# Patient Record
Sex: FEMALE | Race: BLACK OR AFRICAN AMERICAN | Employment: OTHER | URBAN - METROPOLITAN AREA
[De-identification: names, ages, dates, MRNs, and addresses within clinical notes are randomized per-mention and may not be internally consistent; named-entity substitution may affect disease eponyms.]

---

## 2017-06-02 RX ORDER — METOPROLOL SUCCINATE 100 MG/1
TABLET, EXTENDED RELEASE ORAL
Qty: 135 TAB | Refills: 1 | Status: SHIPPED | OUTPATIENT
Start: 2017-06-02 | End: 2020-03-28 | Stop reason: SDUPTHER

## 2018-10-25 ENCOUNTER — OFFICE VISIT (OUTPATIENT)
Dept: NEUROLOGY | Age: 58
End: 2018-10-25

## 2018-10-25 VITALS — RESPIRATION RATE: 20 BRPM | HEIGHT: 63 IN | BODY MASS INDEX: 40.75 KG/M2 | WEIGHT: 230 LBS

## 2018-10-25 DIAGNOSIS — G89.29 CHRONIC LOW BACK PAIN, UNSPECIFIED BACK PAIN LATERALITY, WITH SCIATICA PRESENCE UNSPECIFIED: Primary | ICD-10-CM

## 2018-10-25 DIAGNOSIS — M54.5 CHRONIC LOW BACK PAIN, UNSPECIFIED BACK PAIN LATERALITY, WITH SCIATICA PRESENCE UNSPECIFIED: Primary | ICD-10-CM

## 2018-10-25 NOTE — PATIENT INSTRUCTIONS
After sitting for a while once she gets up she has a lot of tail bone pain   Epidural injections? Has had about 6 epidural injections in the last 2 years with Dr. Elma Ramírez from her shoulders to her hips   After she sits she does have some numbness in the back of her legs   She has no bowel/bladder control anymore   Had a fall of Sept 2016 and since then she has had bulging discs issues              A Healthy Lifestyle: Care Instructions  Your Care Instructions    A healthy lifestyle can help you feel good, stay at a healthy weight, and have plenty of energy for both work and play. A healthy lifestyle is something you can share with your whole family. A healthy lifestyle also can lower your risk for serious health problems, such as high blood pressure, heart disease, and diabetes. You can follow a few steps listed below to improve your health and the health of your family. Follow-up care is a key part of your treatment and safety. Be sure to make and go to all appointments, and call your doctor if you are having problems. It's also a good idea to know your test results and keep a list of the medicines you take. How can you care for yourself at home? · Do not eat too much sugar, fat, or fast foods. You can still have dessert and treats now and then. The goal is moderation. · Start small to improve your eating habits. Pay attention to portion sizes, drink less juice and soda pop, and eat more fruits and vegetables. ? Eat a healthy amount of food. A 3-ounce serving of meat, for example, is about the size of a deck of cards. Fill the rest of your plate with vegetables and whole grains. ? Limit the amount of soda and sports drinks you have every day. Drink more water when you are thirsty. ? Eat at least 5 servings of fruits and vegetables every day. It may seem like a lot, but it is not hard to reach this goal. A serving or helping is 1 piece of fruit, 1 cup of vegetables, or 2 cups of leafy, raw vegetables.  Have an apple or some carrot sticks as an afternoon snack instead of a candy bar. Try to have fruits and/or vegetables at every meal.  · Make exercise part of your daily routine. You may want to start with simple activities, such as walking, bicycling, or slow swimming. Try to be active 30 to 60 minutes every day. You do not need to do all 30 to 60 minutes all at once. For example, you can exercise 3 times a day for 10 or 20 minutes. Moderate exercise is safe for most people, but it is always a good idea to talk to your doctor before starting an exercise program.  · Keep moving. Doyne Acron the lawn, work in the garden, or [employer]. Take the stairs instead of the elevator at work. · If you smoke, quit. People who smoke have an increased risk for heart attack, stroke, cancer, and other lung illnesses. Quitting is hard, but there are ways to boost your chance of quitting tobacco for good. ? Use nicotine gum, patches, or lozenges. ? Ask your doctor about stop-smoking programs and medicines. ? Keep trying. In addition to reducing your risk of diseases in the future, you will notice some benefits soon after you stop using tobacco. If you have shortness of breath or asthma symptoms, they will likely get better within a few weeks after you quit. · Limit how much alcohol you drink. Moderate amounts of alcohol (up to 2 drinks a day for men, 1 drink a day for women) are okay. But drinking too much can lead to liver problems, high blood pressure, and other health problems. Family health  If you have a family, there are many things you can do together to improve your health. · Eat meals together as a family as often as possible. · Eat healthy foods. This includes fruits, vegetables, lean meats and dairy, and whole grains. · Include your family in your fitness plan. Most people think of activities such as jogging or tennis as the way to fitness, but there are many ways you and your family can be more active.  Anything that makes you breathe hard and gets your heart pumping is exercise. Here are some tips:  ? Walk to do errands or to take your child to school or the bus.  ? Go for a family bike ride after dinner instead of watching TV. Where can you learn more? Go to http://arlene-sofi.info/. Enter B521 in the search box to learn more about \"A Healthy Lifestyle: Care Instructions. \"  Current as of: December 7, 2017  Content Version: 11.8  © 8441-1957 [employer], Incorporated. Care instructions adapted under license by [employer] (which disclaims liability or warranty for this information). If you have questions about a medical condition or this instruction, always ask your healthcare professional. [address] any warranty or liability for your use of this information.

## 2018-10-25 NOTE — PROGRESS NOTES
Patient was referred to discuss lumbar epidural steroid injection regarding severe lower back pain, lumbar radicular pain she's experienced after a fall (Sept 2016). She's has multiple injections by Dr Natty [facility]. I have no records (progress notes or imaging reports) to review today. Patient reports she will return to Dr Ruby Martinez (ortho) and Dr Peyton Juárez to discuss further care.

## 2019-04-25 ENCOUNTER — APPOINTMENT (OUTPATIENT)
Dept: GENERAL RADIOLOGY | Age: 59
End: 2019-04-25
Attending: STUDENT IN AN ORGANIZED HEALTH CARE EDUCATION/TRAINING PROGRAM
Payer: COMMERCIAL

## 2019-04-25 ENCOUNTER — HOSPITAL ENCOUNTER (EMERGENCY)
Age: 59
Discharge: HOME | End: 2019-04-25
Attending: STUDENT IN AN ORGANIZED HEALTH CARE EDUCATION/TRAINING PROGRAM | Admitting: STUDENT IN AN ORGANIZED HEALTH CARE EDUCATION/TRAINING PROGRAM
Payer: COMMERCIAL

## 2019-04-25 VITALS
DIASTOLIC BLOOD PRESSURE: 99 MMHG | BODY MASS INDEX: 40.74 KG/M2 | WEIGHT: 230 LBS | OXYGEN SATURATION: 98 % | HEART RATE: 93 BPM | SYSTOLIC BLOOD PRESSURE: 165 MMHG | TEMPERATURE: 98.8 F | RESPIRATION RATE: 16 BRPM

## 2019-04-25 DIAGNOSIS — M54.30 SCIATICA, UNSPECIFIED LATERALITY: Primary | ICD-10-CM

## 2019-04-25 PROCEDURE — 99283 EMERGENCY DEPT VISIT LOW MDM: CPT

## 2019-04-25 PROCEDURE — 74011250636 HC RX REV CODE- 250/636: Performed by: STUDENT IN AN ORGANIZED HEALTH CARE EDUCATION/TRAINING PROGRAM

## 2019-04-25 PROCEDURE — 96372 THER/PROPH/DIAG INJ SC/IM: CPT

## 2019-04-25 PROCEDURE — 74011250637 HC RX REV CODE- 250/637: Performed by: STUDENT IN AN ORGANIZED HEALTH CARE EDUCATION/TRAINING PROGRAM

## 2019-04-25 PROCEDURE — 72100 X-RAY EXAM L-S SPINE 2/3 VWS: CPT

## 2019-04-25 RX ORDER — ACETAMINOPHEN 325 MG/1
650 TABLET ORAL
Status: COMPLETED | OUTPATIENT
Start: 2019-04-25 | End: 2019-04-25

## 2019-04-25 RX ORDER — PREDNISONE 50 MG/1
50 TABLET ORAL DAILY
Qty: 3 TAB | Refills: 0 | Status: SHIPPED | OUTPATIENT
Start: 2019-04-25 | End: 2019-04-28

## 2019-04-25 RX ORDER — CYCLOBENZAPRINE HCL 5 MG
5 TABLET ORAL
Qty: 15 TAB | Refills: 0 | Status: SHIPPED | OUTPATIENT
Start: 2019-04-25 | End: 2019-04-30

## 2019-04-25 RX ORDER — KETOROLAC TROMETHAMINE 30 MG/ML
30 INJECTION, SOLUTION INTRAMUSCULAR; INTRAVENOUS
Status: COMPLETED | OUTPATIENT
Start: 2019-04-25 | End: 2019-04-25

## 2019-04-25 RX ADMIN — ACETAMINOPHEN 650 MG: 325 TABLET ORAL at 17:41

## 2019-04-25 RX ADMIN — KETOROLAC TROMETHAMINE 30 MG: 30 INJECTION, SOLUTION INTRAMUSCULAR at 16:26

## 2019-04-25 NOTE — ED PROVIDER NOTES
62 y.o. female with past medical history significant for asthma, HTN, anxiety, DJD, and fibromyalgia who presents from home via private vehicle with chief complaint of back pain. 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 While doing this, she felt lightheaded, was seen at on outside facility and given IVF. She has been resting since getting home, then one hour ago tried to get up but started with right sided lower back pain radiating down her right leg, similar to previous episodes of sciatica. Patient took flexeril PTA with some relief. When asked about bowel or bladder incontinence, patient states she has not had control of her bladder in 30 years and no control of her bowels for 2 years. Patient states she is on gabapentin, has been off Cymbalta for a few months. Last MRI was \"a little over a year ago\". She has a h/o multiple steroid injections, has already contacted her orthopedist about this episode of pain. Patient denies any chest pain, SOB, abdominal pain, or fevers. There are no other acute medical concerns at this time. Social hx: Nonsmoker  PCP: Elise Lantigua MD  Ortho: Giselle Hussein MD    Note written by Gavi Calle, as dictated by Ariel Martínez MD 4:08 PM    The history is provided by the patient. No  was used.         Past Medical History:   Diagnosis Date    Anxiety      [facility] (allergic rhinitis)      [facility]    Asthma      Cellulitis of face 6/2016    [facility]- right side of face    CTS (carpal tunnel syndrome)      DJD (degenerative joint disease) of lumbar spine     Fatty liver     Fibromyalgia     HBP (high blood pressure)      Hiatal hernia     Lumbar disc disease      Lumbar spondylosis 12/10/13    Peripheral edema      Radiculitis 12/10/13    lumbar    Trigger thumb of left hand 6/17/2013    Injection left thumb mid March 2013, Dr Daivd Curiel incontinence        riya morel       Past Surgical History:   Procedure Laterality Date    COLONOSCOPY      nov 2010; normal    HX GI      cholestcystec    HX GYN      bladder neck    HX HERNIA REPAIR      hiatal hernia    HX ORTHOPAEDIC      carpal and tarsal tunnel released    HX ORTHOPAEDIC  4/29/2013    2nd and 4th finger, right hand, trigger release         Family History:   Problem Relation Age of Onset    Alcohol abuse Father     Cancer Father         prostate    Hypertension Father     Diabetes Father     Diabetes Mother     Hypertension Mother     Arthritis-osteo Mother     Alcohol abuse Brother     Cancer Maternal Uncle         colon    Stroke Paternal Uncle     Cancer Maternal Grandfather         lung ca       Social History     Socioeconomic History    Marital status: SINGLE     Spouse name: Not on file    Number of children: Not on file    Years of education: Not on file    Highest education level: Not on file   Occupational History    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[place] [employer]     Employer: department of [employer] resource strain: Not on file    Food insecurity:     Worry: Not on file     Inability: Not on file    Transportation needs:     Medical: Not on file     Non-medical: Not on file   Tobacco Use    Smoking status: Never Smoker    Smokeless tobacco: Never Used   Substance and Sexual Activity    Alcohol use: No    Drug use: No    Sexual activity: Not Currently   Lifestyle    Physical activity:     Days per week: Not on file     Minutes per session: Not on file    Stress: Not on file   Relationships    Social connections:     Talks on phone: Not on file     Gets together: Not on file     Attends [employer] service: Not on file     Active member of club or organization: Not on file     Attends meetings of clubs or organizations: Not on file     Relationship status: Not on file    Intimate partner violence:     Fear of current or ex partner: Not on file     Emotionally abused: Not on file     Physically abused: Not on file     Forced sexual activity: Not on file   Other Topics Concern    Not on file   Social History Narrative    Not on file         ALLERGIES: Ace inhibitors; Atorvastatin; Marcaine [bupivacaine]; and Sulfa (sulfonamide antibiotics)    Review of Systems   Constitutional: Negative for activity change, diaphoresis, fatigue and fever. HENT: Negative for congestion and sore throat. Eyes: Negative for photophobia and visual disturbance. Respiratory: Negative for chest tightness and shortness of breath. Cardiovascular: Negative for chest pain, palpitations and leg swelling. Gastrointestinal: Negative for abdominal pain, blood in stool, constipation, diarrhea, nausea and vomiting. Genitourinary: Negative for difficulty urinating, dysuria, flank pain, frequency and hematuria. Musculoskeletal: Positive for back pain and myalgias. Neurological: Negative for dizziness, syncope, numbness and headaches. All other systems reviewed and are negative. Vitals:    04/25/19 1611   BP: (!) 165/99   Pulse: 93   Resp: 16   Temp: 98.8 °F (37.1 °C)   SpO2: 98%   Weight: 104.3 kg (230 lb)            Physical Exam   Constitutional: She is oriented to person, place, and time. She appears well-developed and well-nourished. No distress. HENT:   Head: Normocephalic and atraumatic. Nose: Nose normal.   Mouth/Throat: Oropharynx is clear and moist. No oropharyngeal exudate. Eyes: Conjunctivae and EOM are normal. Right eye exhibits no discharge. Left eye exhibits no discharge. No scleral icterus. Neck: Normal range of motion. Neck supple. Cardiovascular: Normal rate, regular rhythm and intact distal pulses. Pulmonary/Chest: Effort normal. No respiratory distress. She exhibits no tenderness. Abdominal: Bowel sounds are normal. She exhibits no distension and no mass. There is no tenderness. There is no rebound.    Musculoskeletal: Normal range of motion. She exhibits no edema. Lumbar back: She exhibits tenderness. Midline L-spine tenderness. Neurological: She is alert and oriented to person, place, and time. No cranial nerve deficit. Coordination normal.   Skin: Skin is warm and dry. No rash noted. She is not diaphoretic. No erythema. No pallor. Psychiatric: She has a normal mood and affect. Her behavior is normal. Judgment and thought content normal.   Nursing note and vitals reviewed. Note written by Gavi Carter, as dictated by Sita Disla MD 4:08 PM    MDM  Number of Diagnoses or Management Options  Sciatica, unspecified laterality:      Amount and/or Complexity of Data Reviewed  Tests in the radiology section of CPT®: reviewed and ordered  Review and summarize past medical records: yes  Independent visualization of images, tracings, or specimens: yes    Risk of Complications, Morbidity, and/or Mortality  Presenting problems: moderate  Diagnostic procedures: moderate  Management options: moderate    Patient Progress  Patient progress: improved         Procedures    5:46 PM  X-Ray negative. Discharging home. Patient agreeable.

## 2019-04-25 NOTE — ED TRIAGE NOTES
Here with sciatica, right leg pain and spasm. Hx of same. Felt bad last night at work, seen in ER last night at work and given fluids. Took flexeril at home. No change in bladder,bowel control. Baseline is not controlled. No change.

## 2019-04-25 NOTE — DISCHARGE INSTRUCTIONS
Patient Education        Back Pain: Care Instructions  Your Care Instructions    Back pain has many possible causes. It is often related to problems with muscles and ligaments of the back. It may also be related to problems with the nerves, discs, or bones of the back. Moving, lifting, standing, sitting, or sleeping in an awkward way can strain the back. Sometimes you don't notice the injury until later. Arthritis is another common cause of back pain. Although it may hurt a lot, back pain usually improves on its own within several weeks. Most people recover in 12 weeks or less. Using good home treatment and being careful not to stress your back can help you feel better sooner. Follow-up care is a key part of your treatment and safety. Be sure to make and go to all appointments, and call your doctor if you are having problems. It's also a good idea to know your test results and keep a list of the medicines you take. How can you care for yourself at home? · Sit or lie in positions that are most comfortable and reduce your pain. Try one of these positions when you lie down:  ? Lie on your back with your knees bent and supported by large pillows. ? Lie on the floor with your legs on the seat of a sofa or chair. ? Lie on your side with your knees and hips bent and a pillow between your legs. ? Lie on your stomach if it does not make pain worse. · Do not sit up in bed, and avoid soft couches and twisted positions. Bed rest can help relieve pain at first, but it delays healing. Avoid bed rest after the first day of back pain. · Change positions every 30 minutes. If you must sit for long periods of time, take breaks from sitting. Get up and walk around, or lie in a comfortable position. · Try using a heating pad on a low or medium setting for 15 to 20 minutes every 2 or 3 hours. Try a warm shower in place of one session with the heating pad. · You can also try an ice pack for 10 to 15 minutes every 2 to 3 hours. Put a thin cloth between the ice pack and your skin. · Take pain medicines exactly as directed. ? If the doctor gave you a prescription medicine for pain, take it as prescribed. ? If you are not taking a prescription pain medicine, ask your doctor if you can take an over-the-counter medicine. · Take short walks several times a day. You can start with 5 to 10 minutes, 3 or 4 times a day, and work up to longer walks. Walk on level surfaces and avoid hills and stairs until your back is better. · Return to work and other activities as soon as you can. Continued rest without activity is usually not good for your back. · To prevent future back pain, do exercises to stretch and strengthen your back and stomach. Learn how to use good posture, safe lifting techniques, and proper body mechanics. When should you call for help? Call your doctor now or seek immediate medical care if:    · You have new or worsening numbness in your legs.     · You have new or worsening weakness in your legs. (This could make it hard to stand up.)     · You lose control of your bladder or bowels.    Watch closely for changes in your health, and be sure to contact your doctor if:    · You have a fever, lose weight, or don't feel well.     · You do not get better as expected. Where can you learn more? Go to http://arlene-sofi.info/. Enter O924 in the search box to learn more about \"Back Pain: Care Instructions. \"  Current as of: September 20, 2018  Content Version: 11.9  © 1599-2243 [employer]. Care instructions adapted under license by [employer] (which disclaims liability or warranty for this information). If you have questions about a medical condition or this instruction, always ask your healthcare professional. [place] [address] any warranty or liability for your use of this information.

## 2019-09-23 ENCOUNTER — APPOINTMENT (OUTPATIENT)
Dept: CT IMAGING | Age: 59
End: 2019-09-23
Attending: EMERGENCY MEDICINE
Payer: COMMERCIAL

## 2019-09-23 ENCOUNTER — HOSPITAL ENCOUNTER (EMERGENCY)
Age: 59
Discharge: HOME | End: 2019-09-24
Attending: EMERGENCY MEDICINE
Payer: COMMERCIAL

## 2019-09-23 DIAGNOSIS — N30.00 ACUTE CYSTITIS WITHOUT HEMATURIA: Primary | ICD-10-CM

## 2019-09-23 DIAGNOSIS — R53.83 MALAISE AND FATIGUE: ICD-10-CM

## 2019-09-23 DIAGNOSIS — R53.81 MALAISE AND FATIGUE: ICD-10-CM

## 2019-09-23 LAB
ALBUMIN SERPL-MCNC: 4 G/DL (ref 3.5–5)
ALBUMIN/GLOB SERPL: 1 {RATIO} (ref 1.1–2.2)
ALP SERPL-CCNC: 85 U/L (ref 45–117)
ALT SERPL-CCNC: 32 U/L (ref 12–78)
AMMONIA PLAS-SCNC: 23 UMOL/L
ANION GAP SERPL CALC-SCNC: 8 MMOL/L (ref 5–15)
AST SERPL-CCNC: 25 U/L (ref 15–37)
BASOPHILS # BLD: 0 K/UL (ref 0–0.1)
BASOPHILS NFR BLD: 1 % (ref 0–1)
BILIRUB SERPL-MCNC: 0.5 MG/DL (ref 0.2–1)
BUN SERPL-MCNC: 10 MG/DL (ref 6–20)
BUN/CREAT SERPL: 12 (ref 12–20)
CALCIUM SERPL-MCNC: 9.4 MG/DL (ref 8.5–10.1)
CHLORIDE SERPL-SCNC: 105 MMOL/L (ref 97–108)
CO2 SERPL-SCNC: 27 MMOL/L (ref 21–32)
COMMENT, HOLDF: NORMAL
CREAT SERPL-MCNC: 0.83 MG/DL (ref 0.55–1.02)
DIFFERENTIAL METHOD BLD: NORMAL
EOSINOPHIL # BLD: 0.2 K/UL (ref 0–0.4)
EOSINOPHIL NFR BLD: 3 % (ref 0–7)
ERYTHROCYTE [DISTWIDTH] IN BLOOD BY AUTOMATED COUNT: 13.3 % (ref 11.5–14.5)
GLOBULIN SER CALC-MCNC: 3.9 G/DL (ref 2–4)
GLUCOSE SERPL-MCNC: 182 MG/DL (ref 65–100)
HCT VFR BLD AUTO: 38 % (ref 35–47)
HGB BLD-MCNC: 12.3 G/DL (ref 11.5–16)
IMM GRANULOCYTES # BLD AUTO: 0 K/UL (ref 0–0.04)
IMM GRANULOCYTES NFR BLD AUTO: 0 % (ref 0–0.5)
LACTATE BLD-SCNC: 1.71 MMOL/L (ref 0.4–2)
LYMPHOCYTES # BLD: 2.1 K/UL (ref 0.8–3.5)
LYMPHOCYTES NFR BLD: 30 % (ref 12–49)
MCH RBC QN AUTO: 28.7 PG (ref 26–34)
MCHC RBC AUTO-ENTMCNC: 32.4 G/DL (ref 30–36.5)
MCV RBC AUTO: 88.8 FL (ref 80–99)
MONOCYTES # BLD: 0.6 K/UL (ref 0–1)
MONOCYTES NFR BLD: 9 % (ref 5–13)
NEUTS SEG # BLD: 3.9 K/UL (ref 1.8–8)
NEUTS SEG NFR BLD: 57 % (ref 32–75)
NRBC # BLD: 0 K/UL (ref 0–0.01)
NRBC BLD-RTO: 0 PER 100 WBC
PLATELET # BLD AUTO: 226 K/UL (ref 150–400)
PMV BLD AUTO: 11.2 FL (ref 8.9–12.9)
POTASSIUM SERPL-SCNC: 3.8 MMOL/L (ref 3.5–5.1)
PROT SERPL-MCNC: 7.9 G/DL (ref 6.4–8.2)
RBC # BLD AUTO: 4.28 M/UL (ref 3.8–5.2)
SAMPLES BEING HELD,HOLD: NORMAL
SODIUM SERPL-SCNC: 140 MMOL/L (ref 136–145)
TROPONIN I SERPL-MCNC: <0.05 NG/ML
TSH SERPL DL<=0.05 MIU/L-ACNC: 1.89 UIU/ML (ref 0.36–3.74)
WBC # BLD AUTO: 6.9 K/UL (ref 3.6–11)

## 2019-09-23 PROCEDURE — 84443 ASSAY THYROID STIM HORMONE: CPT

## 2019-09-23 PROCEDURE — 70450 CT HEAD/BRAIN W/O DYE: CPT

## 2019-09-23 PROCEDURE — 87040 BLOOD CULTURE FOR BACTERIA: CPT

## 2019-09-23 PROCEDURE — 84484 ASSAY OF TROPONIN QUANT: CPT

## 2019-09-23 PROCEDURE — 81001 URINALYSIS AUTO W/SCOPE: CPT

## 2019-09-23 PROCEDURE — 82140 ASSAY OF AMMONIA: CPT

## 2019-09-23 PROCEDURE — 36415 COLL VENOUS BLD VENIPUNCTURE: CPT

## 2019-09-23 PROCEDURE — 74011250636 HC RX REV CODE- 250/636: Performed by: EMERGENCY MEDICINE

## 2019-09-23 PROCEDURE — 99285 EMERGENCY DEPT VISIT HI MDM: CPT

## 2019-09-23 PROCEDURE — 87086 URINE CULTURE/COLONY COUNT: CPT

## 2019-09-23 PROCEDURE — 80053 COMPREHEN METABOLIC PANEL: CPT

## 2019-09-23 PROCEDURE — 96374 THER/PROPH/DIAG INJ IV PUSH: CPT

## 2019-09-23 PROCEDURE — 83605 ASSAY OF LACTIC ACID: CPT

## 2019-09-23 PROCEDURE — 85025 COMPLETE CBC W/AUTO DIFF WBC: CPT

## 2019-09-23 RX ORDER — ONDANSETRON 2 MG/ML
4 INJECTION INTRAMUSCULAR; INTRAVENOUS
Status: COMPLETED | OUTPATIENT
Start: 2019-09-23 | End: 2019-09-23

## 2019-09-23 RX ADMIN — SODIUM CHLORIDE 1000 ML: 900 INJECTION, SOLUTION INTRAVENOUS at 23:17

## 2019-09-23 RX ADMIN — ONDANSETRON 4 MG: 2 INJECTION INTRAMUSCULAR; INTRAVENOUS at 23:18

## 2019-09-24 VITALS
RESPIRATION RATE: 16 BRPM | HEIGHT: 63 IN | OXYGEN SATURATION: 94 % | DIASTOLIC BLOOD PRESSURE: 83 MMHG | HEART RATE: 97 BPM | SYSTOLIC BLOOD PRESSURE: 148 MMHG | TEMPERATURE: 98.4 F | BODY MASS INDEX: 38.98 KG/M2 | WEIGHT: 220 LBS

## 2019-09-24 LAB
APPEARANCE UR: CLEAR
BACTERIA URNS QL MICRO: ABNORMAL /HPF
BILIRUB UR QL: NEGATIVE
COLOR UR: ABNORMAL
EPITH CASTS URNS QL MICRO: ABNORMAL /LPF
GLUCOSE UR STRIP.AUTO-MCNC: NEGATIVE MG/DL
HGB UR QL STRIP: NEGATIVE
HYALINE CASTS URNS QL MICRO: ABNORMAL /LPF (ref 0–5)
KETONES UR QL STRIP.AUTO: NEGATIVE MG/DL
LEUKOCYTE ESTERASE UR QL STRIP.AUTO: ABNORMAL
NITRITE UR QL STRIP.AUTO: NEGATIVE
PH UR STRIP: 6 [PH] (ref 5–8)
PROT UR STRIP-MCNC: ABNORMAL MG/DL
RBC #/AREA URNS HPF: ABNORMAL /HPF (ref 0–5)
SP GR UR REFRACTOMETRY: 1.02 (ref 1–1.03)
UA: UC IF INDICATED,UAUC: ABNORMAL
UROBILINOGEN UR QL STRIP.AUTO: 0.2 EU/DL (ref 0.2–1)
WBC URNS QL MICRO: ABNORMAL /HPF (ref 0–4)

## 2019-09-24 RX ORDER — ONDANSETRON 8 MG/1
8 TABLET, ORALLY DISINTEGRATING ORAL
Qty: 15 TAB | Refills: 0 | Status: SHIPPED | OUTPATIENT
Start: 2019-09-24 | End: 2021-08-16

## 2019-09-24 RX ORDER — FLUCONAZOLE 150 MG/1
150 TABLET ORAL DAILY
Qty: 1 TAB | Refills: 0 | Status: SHIPPED | OUTPATIENT
Start: 2019-09-24 | End: 2020-02-20 | Stop reason: ALTCHOICE

## 2019-09-24 RX ORDER — CEPHALEXIN 500 MG/1
500 CAPSULE ORAL 2 TIMES DAILY
Qty: 8 CAP | Refills: 0 | Status: SHIPPED | OUTPATIENT
Start: 2019-09-24 | End: 2019-09-28

## 2019-09-24 NOTE — ED TRIAGE NOTES
Pt reports she was at work on Friday night and \"I thought I passed out but my coworker thought I had a seizure because I lost bladder control. They did labs and an EKG and told me my lactic was elevated, so they gave me fluids. \" Pt denies any seizure hx. Pt referred here for employee health eval. Pt reports feeling \"washed out and exhausted\" since then. Pt reports chills, muscle and \"bone aches\".

## 2019-09-24 NOTE — ED NOTES
Bedside shift change report given to Olivia Luna (oncoming nurse) by Andrea Jaquez RN (offgoing nurse). Report included the following information SBAR, ED Summary, Procedure Summary, MAR and Recent Results.

## 2019-09-24 NOTE — DISCHARGE INSTRUCTIONS
Patient Education        Urinary Tract Infection in Women: Care Instructions  Your Care Instructions    A urinary tract infection, or UTI, is a general term for an infection anywhere between the kidneys and the urethra (where urine comes out). Most UTIs are bladder infections. They often cause pain or burning when you urinate. UTIs are caused by bacteria and can be cured with antibiotics. Be sure to complete your treatment so that the infection goes away. Follow-up care is a key part of your treatment and safety. Be sure to make and go to all appointments, and call your doctor if you are having problems. It's also a good idea to know your test results and keep a list of the medicines you take. How can you care for yourself at home? · Take your antibiotics as directed. Do not stop taking them just because you feel better. You need to take the full course of antibiotics. · Drink extra water and other fluids for the next day or two. This may help wash out the bacteria that are causing the infection. (If you have kidney, heart, or liver disease and have to limit fluids, talk with your doctor before you increase your fluid intake.)  · Avoid drinks that are carbonated or have caffeine. They can irritate the bladder. · Urinate often. Try to empty your bladder each time. · To relieve pain, take a hot bath or lay a heating pad set on low over your lower belly or genital area. Never go to sleep with a heating pad in place. To prevent UTIs  · Drink plenty of water each day. This helps you urinate often, which clears bacteria from your system. (If you have kidney, heart, or liver disease and have to limit fluids, talk with your doctor before you increase your fluid intake.)  · Urinate when you need to. · Urinate right after you have sex. · Change sanitary pads often. · Avoid douches, bubble baths, feminine hygiene sprays, and other feminine hygiene products that have deodorants.   · After going to the bathroom, wipe from front to back. When should you call for help? Call your doctor now or seek immediate medical care if:    · Symptoms such as fever, chills, nausea, or vomiting get worse or appear for the first time.     · You have new pain in your back just below your rib cage. This is called flank pain.     · There is new blood or pus in your urine.     · You have any problems with your antibiotic medicine.    Watch closely for changes in your health, and be sure to contact your doctor if:    · You are not getting better after taking an antibiotic for 2 days.     · Your symptoms go away but then come back. Where can you learn more? Go to http://arlene-sofi.info/. Enter O625 in the search box to learn more about \"Urinary Tract Infection in Women: Care Instructions. \"  Current as of: December 19, 2018  Content Version: 12.2  © 2761-3056 [employer]. Care instructions adapted under license by [employer] (which disclaims liability or warranty for this information). If you have questions about a medical condition or this instruction, always ask your healthcare professional. [address] any warranty or liability for your use of this information. Patient Education        Fatigue: Care Instructions  Your Care Instructions    Fatigue is a feeling of tiredness, exhaustion, or lack of energy. You may feel fatigue because of too much or not enough activity. It can also come from stress, lack of sleep, boredom, and poor diet. Many medical problems, such as viral infections, can cause fatigue. Emotional problems, especially depression, are often the cause of fatigue. Fatigue is most often a symptom of another problem. Treatment for fatigue depends on the cause. For example, if you have fatigue because you have a certain health problem, treating this problem also treats your fatigue. If depression or anxiety is the cause, treatment may help.   Follow-up care is a key part of your treatment and safety. Be sure to make and go to all appointments, and call your doctor if you are having problems. It's also a good idea to know your test results and keep a list of the medicines you take. How can you care for yourself at home? · Get regular exercise. But don't overdo it. Go back and forth between rest and exercise. · Get plenty of rest.  · Eat a healthy diet. Do not skip meals, especially breakfast.  · Reduce your use of caffeine, tobacco, and alcohol. Caffeine is most often found in coffee, tea, cola drinks, and chocolate. · Limit medicines that can cause fatigue. This includes tranquilizers and cold and allergy medicines. When should you call for help? Watch closely for changes in your health, and be sure to contact your doctor if:    · You have new symptoms such as fever or a rash.     · Your fatigue gets worse.     · You have been feeling down, depressed, or hopeless. Or you may have lost interest in things that you usually enjoy.     · You are not getting better as expected. Where can you learn more? Go to http://arlene-sofi.info/. Enter O867 in the search box to learn more about \"Fatigue: Care Instructions. \"  Current as of: June 26, 2019  Content Version: 12.2  © 1917-4023 [employer]. Care instructions adapted under license by [employer] (which disclaims liability or warranty for this information). If you have questions about a medical condition or this instruction, always ask your healthcare professional. [address] any warranty or liability for your use of this information.

## 2019-09-24 NOTE — ED PROVIDER NOTES
61 y.o. female with past medical history significant for lumbar spondylosis, lumbar DJD, HTN, fatty liver, and fibromyalgia who presents ambulatory to ED with chief complaint of fatigue. Pt reports N/V, chest tightness, headache, sore throat onset on Friday night 9/20/19 as she was leaving work, and states that she went back up to her office to get her inhaler following onset of symptoms and then states that she suddenly \"blacked out\" while walking up to her office. Pt almost fell to the floor but was caught by a nearby nurse prior to hitting the floor, who thinks the pt had a seizure. During the episode, the pt was choking, her eyes rolled into the back of head, she experienced enuresis, and was unconscious for a brief period of time. PT was taken to ED immediately afterwards for eval which she does not remember too well. Pt was found to have an elevated lactic acid and was discharged with a regimen of marcobid due to suspected UTI. Pt does not remember the episode of syncope/seizure too well either and was filled in by the nurse who witnessed the event. Pt has not been able to see a neurologist yet. PT has never had a seizure in the past. Pt reports feeling fatigued, chills, and \"bone aches\" since the episode. Pt was told to come to ED for an employee health evaluation    PCP: Alhaji Taylor MD    Note written by Gavi Raza, as dictated by Titi Bryant MD 10:10 PM    The history is provided by the patient. No  was used.         Past Medical History:   Diagnosis Date    Anxiety      [facility] rh[facility]    Asthma      Cellulitis of face 6/2016    [facility]- right side of face    CTS (carpal tunnel syndrome)      DJD (degenerative joint disease) of lumbar spine     Fatty liver     Fibromyalgia     HBP (high blood pressure)      Hiatal hernia     Lumbar disc disease      Lumbar spondylosis 12/10/13    Peripheral edema  Radiculitis 12/10/13    lumbar    Trigger thumb of left hand 6/17/2013    Injection left thumb mid March 2013, Dr Anitra Bui incontinence       Dr. Gildardo Graham       Past Surgical History:   Procedure Laterality Date    COLONOSCOPY      nov 2010; normal    HX GI      cholestcystec    HX GYN      bladder neck    HX HERNIA REPAIR      hiatal hernia    HX ORTHOPAEDIC      carpal and tarsal tunnel released    HX ORTHOPAEDIC  4/29/2013    2nd and 4th finger, right hand, trigger release         Family History:   Problem Relation Age of Onset    Alcohol abuse Father     Cancer Father         prostate    Hypertension Father     Diabetes Father     Diabetes Mother     Hypertension Mother     Arthritis-osteo Mother     Alcohol abuse Brother     Cancer Maternal Uncle         colon    Stroke Paternal Uncle     Cancer Maternal Grandfather         lung ca       Social History     Socioeconomic History    Marital status: SINGLE     Spouse name: Not on file    Number of children: Not on file    Years of education: Not on file    Highest education level: Not on file   Occupational History    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[place] [employer]     Employer: [employer] resource strain: Not on file    Food insecurity:     Worry: Not on file     Inability: Not on file    Transportation needs:     Medical: Not on file     Non-medical: Not on file   Tobacco Use    Smoking status: Never Smoker    Smokeless tobacco: Never Used   Substance and Sexual Activity    Alcohol use: No    Drug use: No    Sexual activity: Not Currently   Lifestyle    Physical activity:     Days per week: Not on file     Minutes per session: Not on file    Stress: Not on file   Relationships    Social connections:     Talks on phone: Not on file     Gets together: Not on file     Attends Anglican service: Not on file     Active member of club or organization: Not on file Attends meetings of clubs or organizations: Not on file     Relationship status: Not on file    Intimate partner violence:     Fear of current or ex partner: Not on file     Emotionally abused: Not on file     Physically abused: Not on file     Forced sexual activity: Not on file   Other Topics Concern    Not on file   Social History Narrative    Not on file         ALLERGIES: Ace inhibitors; Atorvastatin; Marcaine [bupivacaine]; and Sulfa (sulfonamide antibiotics)    Review of Systems   Constitutional: Positive for chills and fatigue. Negative for fever. Respiratory: Negative for shortness of breath. Cardiovascular: Negative for chest pain. Gastrointestinal: Negative for abdominal pain, constipation, diarrhea and vomiting. Musculoskeletal: Positive for arthralgias. Neurological: Positive for seizures and syncope. Negative for dizziness and light-headedness. All other systems reviewed and are negative. Vitals:    09/23/19 2108   BP: (!) 137/92   Pulse: (!) 111   Resp: 15   Temp: 98.2 °F (36.8 °C)   SpO2: 98%   Weight: 99.8 kg (220 lb)   Height: 5' 3\" (1.6 m)            Physical Exam   Constitutional: She is oriented to person, place, and time. She appears well-developed and well-nourished. HENT:   Head: Normocephalic and atraumatic. Eyes: No scleral icterus. Neck: Normal range of motion. Cardiovascular: Normal rate. Pulmonary/Chest: Effort normal.   Abdominal: She exhibits no distension. Neurological: She is alert and oriented to person, place, and time. No cranial nerve deficit or sensory deficit. She exhibits normal muscle tone. Gait normal.   Skin: No rash noted. No erythema. Nursing note and vitals reviewed. MDM  Number of Diagnoses or Management Options  Acute cystitis without hematuria: established and worsening  Malaise and fatigue: new and requires workup  Diagnosis management comments:  The patient is resting comfortably and feels better, is alert, talkative, interactive and in no distress. Started on Keflex for her UTI. The repeat examination is unremarkable and benign. The patient is neurologically intact, has a normal mental status and is ambulatory in the ED. The history, exam, diagnostic testing (if any) and the patient's current condition do not suggest arrhythmia, STEMI, seizure, meningitis, stroke, sepsis, subarachnoid hemorrhage, intracranial bleeding, encephalitis or other significant pathology that would warrant further testing, continued ED treatment, admission, neurological consultation, or other specialist evaluation at this point. The vital signs have been stable. The patient's condition is stable and appropriate for discharge. The patient will pursue further outpatient evaluation with the primary care physician or other designated or consulting physician as indicated in the discharge instructions. Procedures      10:51 PM  Discussed case with Dr. Viola Prado who does not recommend antiepileptics at this time. He does recommend checking TSH and ammonia levels in the ED combined with outpatient follow-up. The patient's results have been reviewed with them and/or available family. Patient and/or family verbally conveyed their understanding and agreement of the patient's signs, symptoms, diagnosis, treatment and prognosis and additionally agree to follow up as recommended in the discharge instructions or to return to the Emergency Room should their condition change prior to their follow-up appointment. The patient/family verbally agrees with the care-plan and verbally conveys that all of their questions have been answered. The discharge instructions have also been provided to the patient and/or family with some educational information regarding the patient's diagnosis as well a list of reasons why the patient would want to return to the ER prior to their follow-up appointment, should their condition change.

## 2019-09-24 NOTE — ED NOTES
Patient discharged from ED by provider. Discharge instructions reviewed with patient and all questions answered. Patient ambulatory from ED in [facility].

## 2019-09-25 LAB
BACTERIA SPEC CULT: NORMAL
CC UR VC: NORMAL
SERVICE CMNT-IMP: NORMAL

## 2019-09-29 LAB
BACTERIA SPEC CULT: NORMAL
SERVICE CMNT-IMP: NORMAL

## 2019-10-08 ENCOUNTER — TELEPHONE (OUTPATIENT)
Dept: NEUROLOGY | Age: 59
End: 2019-10-08

## 2019-10-08 NOTE — TELEPHONE ENCOUNTER
----- Message from Maria Del Carmen Yen sent at 10/8/2019  3:17 PM EDT -----  Regarding: Dr. Estefany Munoz, [place]/Telephone   General Message/Vendor Calls    Caller's first and last name:  Bree Pino     Reason for call: [facility] required yes/no and why: Yes       Best contact number(s):   [phone]    Details to clarify the request: Pt stated that she fainted at work on September 20th, she would like to schedule a sooner appt with another provider for now.

## 2019-10-21 ENCOUNTER — OFFICE VISIT (OUTPATIENT)
Dept: NEUROLOGY | Age: 59
End: 2019-10-21

## 2019-10-21 ENCOUNTER — TELEPHONE (OUTPATIENT)
Dept: NEUROLOGY | Age: 59
End: 2019-10-21

## 2019-10-21 VITALS
HEART RATE: 89 BPM | RESPIRATION RATE: 20 BRPM | HEIGHT: 63 IN | OXYGEN SATURATION: 97 % | DIASTOLIC BLOOD PRESSURE: 86 MMHG | BODY MASS INDEX: 38.97 KG/M2 | SYSTOLIC BLOOD PRESSURE: 144 MMHG

## 2019-10-21 DIAGNOSIS — R55 SYNCOPE, UNSPECIFIED SYNCOPE TYPE: Primary | ICD-10-CM

## 2019-10-21 RX ORDER — INSULIN GLARGINE 100 [IU]/ML
40 INJECTION, SOLUTION SUBCUTANEOUS
Refills: 2 | COMMUNITY
Start: 2019-09-24 | End: 2020-02-20 | Stop reason: ALTCHOICE

## 2019-10-21 RX ORDER — MELOXICAM 15 MG/1
TABLET ORAL
Refills: 2 | COMMUNITY
Start: 2019-10-15 | End: 2020-02-20 | Stop reason: ALTCHOICE

## 2019-10-21 NOTE — PROGRESS NOTES
Had a blackout spell at work on the 20th of September the staff told her they think she had a seizure   She had smelled something really sweet, she became short of breath and was trying to get her inhaler but she forgot it in her car so she was going to get the nurse to give her a nebulizer but she never made it back to the nurse she had passed out     April event- something similar- she got lightheaded and cramping in her right leg, got really dizzy, she was told she was very dehydrated and she had a bladder infection.

## 2019-10-21 NOTE — PROGRESS NOTES
Name: Whitney Mehta      :  1960    PCP:   Ami Noriega MD      Referring:  Self  MRN:   [id number]    Chief Complaint:   Chief Complaint   Patient presents with    Follow-up     fall d/t a blackout, possible seizure        HISTORY OF PRESENT ILLNESS:     This is a 61 y.o. female who presents for evaluation of blackout episode. Patient says that she was at work [employer]) on 9- PM, noticed a strong smell that triggered her asthma, went to her office to get breathing inhaler, couldn't find it then went to find a Nurse to give her a breathing treatment. After talking to the nurse, another nurse witnessed her pass out. From the description the nurse gave her, the patient was described as gasping for air, eyes rolled back, started to fall down (but nurse caught her), put down on ground, had bladder incontinence, gagging sounds, no description of convulsive activity. She was taken to [place] ER and had chest x-ray, labs, labs showed elevated lactate, low magnesium. Never had similar episodes in the past. Never had a seizure before. Went to [employer] [facility] few days later as she wasn't feeling well overall. They did CT head, normal (reviewed images and agree). They found UTI so treated with Abx. No subsequent spells. She reports that she's had chronic bladder urgency/ incontinence since her 35s and had surgery for it, and separately she's had chronic bowel incontinence since having a fall in 2016.     Complete Review of Systems: reviewed on intake H&P; refer to media section; otherwise as noted in HPI     Allergies   Allergen Reactions    Ace Inhibitors Cough    Atorvastatin Other (comments)     Leg cramps    Marcaine [Bupivacaine] Other (comments)     Low BP    Sulfa (Sulfonamide Antibiotics) Rash     Past Medical History:   Diagnosis Date    Anxiety      AR (allergic rhinitis)      dr Socorro caicedo allergy & asthma    Asthma      Cellulitis of face 2016    [facility]- right side of face    CTS (carpal tunnel syndrome)      DJD (degenerative joint disease) of lumbar spine     Fatty liver     Fibromyalgia     HBP (high blood pressure)      Hiatal hernia     Lumbar disc disease      Lumbar spondylosis 12/10/13    Peripheral edema      Radiculitis 12/10/13    lumbar    Trigger thumb of left hand 6/17/2013    Injection left thumb mid March 2013, Dr Romel Whittaker incontinence       Dr. Serna Pump     Current Outpatient Medications   Medication Sig Dispense Refill    meloxicam (MOBIC) 15 mg tablet TAKE 1 TABLET BY MOUTH EVERY DAY TAKE WITH FOOD  2    LANTUS SOLOSTAR U-100 INSULIN 100 unit/mL (3 mL) inpn 40 Units. 2    fluconazole (DIFLUCAN) 150 mg tablet Take 1 Tab by mouth daily. FDA advises cautious prescribing of oral fluconazole in pregnancy. 1 Tab 0    ondansetron (ZOFRAN ODT) 8 mg disintegrating tablet Take 1 Tab by mouth every eight (8) hours as needed for Nausea. 15 Tab 0    metoprolol succinate (TOPROL-XL) 100 mg tablet TAKE 1 TABLET BY MOUTH DAILY 135 Tab 1    diazePAM (VALIUM) 5 mg tablet Take 1 Tab by mouth every eight (8) hours as needed (muscle relaxation). Max Daily Amount: 15 mg. 20 Tab 0    JANUMET 50-1,000 mg per tablet TAKE 1 TAB BY MOUTH TWO (2) TIMES DAILY (WITH MEALS). 60 Tab 6    amLODIPine (NORVASC) 5 mg tablet TAKE 1 TABLET BY MOUTH DAILY. 90 Tab 1    gabapentin (NEURONTIN) 300 mg capsule TAKE 1 CAP BY MOUTH THREE (3) TIMES DAILY. 540 Cap 0    Insulin Needles, Disposable, 31 gauge x 5/16\" ndle by SubCUTAneous route daily. 1 Package 11    mupirocin (BACTROBAN) 2 % ointment Apply  to affected area daily. 22 g 0    DULoxetine (CYMBALTA) 60 mg capsule TAKE 1 CAPSULE EVERY DAY 90 Cap 1    aspirin delayed-release 81 mg tablet Take 1 Tab by mouth daily. 30 Tab 11    cyclobenzaprine (FLEXERIL) 10 mg tablet Take 1 Tab by mouth nightly.  (Patient taking differently: Take 10 mg by mouth two (2) times a day.) 30 Tab 0    fluticasone (FLONASE) 50 mcg/actuation nasal spray 2 Sprays by Both Nostrils route daily. 1 Bottle 0    losartan (COZAAR) 100 mg tablet TAKE 1 TABLET EVERY DAY 90 Tab 2    montelukast (SINGULAIR) 10 mg tablet TAKE 1 TABLET EVERY DAY 90 Tab 1    albuterol (PROVENTIL HFA, VENTOLIN HFA, PROAIR HFA) 90 mcg/actuation inhaler Take 1 Puff by inhalation every six (6) hours as needed for Wheezing. 1 Inhaler 4    nabumetone (RELAFEN) 750 mg tablet Take 1 Tab by mouth two (2) times a day. 180 Tab 0    naproxen sodium (ALEVE) 220 mg tablet Take 220 mg by mouth two (2) times daily (with meals).  loratadine (CLARITIN) 10 mg tablet Take 10 mg by mouth daily.  clotrimazole (LOTRIMIN) 1 % topical cream Apply  to affected area two (2) times a day. 30 g 1    cholecalciferol, vitamin d3, (VITAMIN D) 1,000 unit tablet Take 4,000 Units by mouth daily. Indications: PREVENTION OF VITAMIN D DEFICIENCY      albuterol (PROVENTIL VENTOLIN) 2.5 mg /3 mL (0.083 %) nebulizer solution 2.5 mg by Nebulization route every six (6) hours as needed. Indications: BRONCHOSPASM PREVENTION      MULTIVITS W-CA,FE,OTHER MIN (WOMEN'S DAILY MULTIVITAMIN PO) Take  by mouth two (2) days a week.  b complex-c-e-zn (STRESSTABS W ZINC) tablet Take 1 Tab by mouth as needed.  pravastatin (PRAVACHOL) 10 mg tablet TAKE 1/2 TABLET BY MOUTH NIGHTLY 30 Tab 3    oxyCODONE-acetaminophen (PERCOCET) 5-325 mg per tablet Take 1 Tab by mouth every four (4) hours as needed for Pain. Max Daily Amount: 6 Tabs. 8 Tab 0    Liraglutide (VICTOZA) 0.6 mg/0.1 mL (18 mg/3 mL) sub-q pen SubQ: Initial: 0.6 mg once daily for 1 week; then increase to 1.2 mg once daily 1 Syringe 12    ranitidine (ZANTAC) 75 mg tablet Take 75 mg by mouth two (2) times a day.  nystatin (MYCOSTATIN) 100,000 unit/mL suspension Take 5 mL by mouth four (4) times daily.  swish and spit 240 mL 1     Past Surgical History:   Procedure Laterality Date    COLONOSCOPY      nov 2010; normal    HX GI cholestcystec    HX GYN      bladder neck    HX HERNIA REPAIR      hiatal hernia    HX ORTHOPAEDIC      carpal and tarsal tunnel released    HX ORTHOPAEDIC  4/29/2013    2nd and 4th finger, right hand, trigger release     Family History   Problem Relation Age of Onset    Alcohol abuse Father     Cancer Father         prostate    Hypertension Father     Diabetes Father     Diabetes Mother     Hypertension Mother     Arthritis-osteo Mother     Alcohol abuse Brother     Cancer Maternal Uncle         colon    Stroke Paternal Uncle     Cancer Maternal Grandfather         lung ca       Social Hx:     Social History     Socioeconomic History    Marital status: SINGLE     Spouse name: Not on file    Number of children: Not on file    Years of education: Not on file    Highest education level: Not on file   Occupational History    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[place] [employer]     Employer: [employer]   Social Needs    Financial resource strain: Not on file    Food insecurity:     Worry: Not on file     Inability: Not on file    Transportation needs:     Medical: Not on file     Non-medical: Not on file   Tobacco Use    Smoking status: Never Smoker    Smokeless tobacco: Never Used   Substance and Sexual Activity    Alcohol use: No    Drug use: No    Sexual activity: Not Currently   Lifestyle    Physical activity:     Days per week: Not on file     Minutes per session: Not on file    Stress: Not on file   Relationships    Social connections:     Talks on phone: Not on file     Gets together: Not on file     Attends Islam service: Not on file     Active member of club or organization: Not on file     Attends meetings of clubs or organizations: Not on file     Relationship status: Not on file    Intimate partner violence:     Fear of current or ex partner: Not on file     Emotionally abused: Not on file     Physically abused: Not on file     Forced sexual activity: Not on file   Other Topics Concern    Not on file   Social History Narrative    Not on file       PHYSICAL EXAM  Vitals:    10/21/19 1445   BP: 144/86   Pulse: 89   Resp: 20   SpO2: 97%   Height: 5' 3\" (1.6 m)       General:  Alert, cooperative, NAD   Head:  Normocephalic, atraumatic. Eyes:  Conjunctivae/corneas clear   Lungs:  Heart:  Non labored breathing  Regular rate, rhythm   Extremities: No edema. Skin: No rashes    Neurologic Exam       Language: normal  Memory:  Alert, oriented to person, place, situation    Cranial Nerves:  I: smell Not tested   II: visual fields Full to confrontation   II: pupils Equal, round, reactive to light   II: optic disc Not examined, not relevant   III,VII: ptosis none   III,IV,VI: extraocular muscles  normal   V: facial light touch sensation  normal   VII: facial muscle function  symmetric   VIII: hearing symmetric   IX: soft palate elevation  normal   XI: sternocleidomastoid strength 5/5   XII: tongue  midline      Motor: normal bulk, tone, strength in all exts  Sensory: mild reduced temp in right lateral foot, otherwise, intact LT, prick, temp, vibration in both feet, legs. Intact LT, temp in both arms (pinprick, vibration not tested. Cerebellar: no rest, postural, or intention tremor  Normal FNF bilaterally  Reflexes: 1+ throughout  Plantar response: not examined   Gait: normal   Romberg negative       ASSESSMENT AND PLAN    ICD-10-CM ICD-9-CM    1. Syncope, unspecified syncope type R55 780.2 DUPLEX CAROTID BILATERAL AMB NEURO      EEG      MRI BRAIN WO CONT      REFERRAL TO CARDIOLOGY     Blackout spell preceded by asthma attack / gasping for air, followed by urinary incontinence (in setting of chronic bowel and bladder incontinence as detailed above). No witnessed convulsion before or after collapsing. No personal history of seizure. History suggests this was vasovagal syncope. Will check EEG, MRI Brain, and Carotid Dopplers for neurologic workup.   Referring to Cardiology to ensure no cardiac rhythm disturbances. F/u after EEG, MRI, Carotids done.      Signed By: Padma Kim MD     October 21, 2019

## 2019-11-04 DIAGNOSIS — R55 SYNCOPE, UNSPECIFIED SYNCOPE TYPE: ICD-10-CM

## 2019-12-04 ENCOUNTER — HOSPITAL ENCOUNTER (OUTPATIENT)
Dept: MRI IMAGING | Age: 59
Discharge: HOME | End: 2019-12-04
Attending: PSYCHIATRY & NEUROLOGY
Payer: COMMERCIAL

## 2019-12-04 DIAGNOSIS — R55 SYNCOPE, UNSPECIFIED SYNCOPE TYPE: ICD-10-CM

## 2019-12-04 PROCEDURE — 70551 MRI BRAIN STEM W/O DYE: CPT

## 2019-12-05 ENCOUNTER — HOSPITAL ENCOUNTER (OUTPATIENT)
Dept: NEUROLOGY | Age: 59
Discharge: HOME | End: 2019-12-05
Attending: PSYCHIATRY & NEUROLOGY
Payer: COMMERCIAL

## 2019-12-05 PROBLEM — R41.82 ALTERED MENTAL STATUS, UNSPECIFIED: Status: ACTIVE | Noted: 2019-12-05

## 2019-12-05 PROBLEM — R55 CONVULSIVE SYNCOPE: Status: ACTIVE | Noted: 2019-12-05

## 2019-12-05 PROCEDURE — 95816 EEG AWAKE AND DROWSY: CPT | Performed by: PSYCHIATRY & NEUROLOGY

## 2020-01-08 ENCOUNTER — OFFICE VISIT (OUTPATIENT)
Dept: NEUROLOGY | Age: 60
End: 2020-01-08

## 2020-01-08 VITALS
DIASTOLIC BLOOD PRESSURE: 88 MMHG | WEIGHT: 220 LBS | OXYGEN SATURATION: 100 % | SYSTOLIC BLOOD PRESSURE: 142 MMHG | HEART RATE: 100 BPM | BODY MASS INDEX: 38.98 KG/M2 | HEIGHT: 63 IN

## 2020-01-08 DIAGNOSIS — R55 VASOVAGAL SYNCOPE: Primary | ICD-10-CM

## 2020-01-08 NOTE — PROCEDURES
Name:   Olivia Child  YOB: 1960  MRN:   [id number]           Procedure:   EEG     Location:   [facility]  Date of Recordin19    Date of Interpretation:    20    Interpreting physician: Michael Turner MD  Requesting provider:   As above      Indication: 61 y.o. female with complaints of episode of syncope, suspect vasovagal episode    Current medications: has a current medication list which includes the following prescription(s): meloxicam, lantus solostar u-100 insulin, fluconazole, ondansetron, metoprolol succinate, janumet, amlodipine, insulin needles (disposable), mupirocin, duloxetine, cyclobenzaprine, montelukast, albuterol, naproxen sodium, ranitidine, nystatin, loratadine, cholecalciferol, albuterol, multivit with calcium,iron,min, b complex-c-e-zn, pravastatin, oxycodone-acetaminophen, diazepam, gabapentin, liraglutide, aspirin delayed-release, fluticasone propionate, losartan, nabumetone, and clotrimazole. Technical:   Digital EEG recorded in 10-20 international placement system, multiple montages    Interpretation:   Patient level of alertness: awake  Background pattern: alert. Posterior dominant rhythm: 9-10 Hz, symmetric. Photic stimulation: no clear photic driving response on either side. Hyperventilation: produced physiological slowing without post-HV abnormalities. Drowsiness recorded: no.  Sleep recorded: no.  Single lead EKG: no abnormalities. Areas of focal slowing: none. Epileptiform discharges: none. Electrographic seizures: none. Impression: This is a normal awake EEG recording. Clinical correlation is necessary.         Michael Turner MD  [facility]

## 2020-01-08 NOTE — PROGRESS NOTES
Neurology Progress Note    Patient ID:   Stevie Freedman  [id number]  61 y.o.  1960    Date of Office Visit: 01/08/20    Chief Complaint   Patient presents with    Results     Interval Hx:     EEG: normal awake recording  MRI Brain: normal  Carotid Dopplers: no significant narrowing    Has not had any further episodes of syncope since the one on 9-20-19. Pt says she was very stressed out that day from having bowel incontinence (chronic issue), work-stressors (works at [place]), and that one of the employees on her unit had passed away suddenly from contacting Legionnaire's disease from the drinking water on her Unit. She got short of breath, was concerned she had contacted legionnaires or some other deadly disease and then passed out. Brief ROS: as noted above or otherwise negative      Objective: Allergies   Allergen Reactions    Ace Inhibitors Cough    Atorvastatin Other (comments)     Leg cramps    Marcaine [Bupivacaine] Other (comments)     Low BP    Sulfa (Sulfonamide Antibiotics) Rash       PMHx:  Past Medical History:   Diagnosis Date    Anxiety      AR (allergic rhinitis)      dr Yarely caicedo allergy & asthma    Asthma      Cellulitis of face 6/2016    [facility]- right side of face    CTS (carpal tunnel syndrome)      DJD (degenerative joint disease) of lumbar spine     Fatty liver     Fibromyalgia     HBP (high blood pressure)      Hiatal hernia     Lumbar disc disease      Lumbar spondylosis 12/10/13    Peripheral edema      Radiculitis 12/10/13    lumbar    Trigger thumb of left hand 6/17/2013    Injection left thumb mid March 2013, Dr Benjamin Manning incontinence       Dr. Mehnaz Jefferson     PSHx:  has a past surgical history that includes hx gyn; colonoscopy; hx gi; hx hernia repair; hx orthopaedic; and hx orthopaedic (4/29/2013).     Current Outpatient Medications on File Prior to Visit   Medication Sig    meloxicam (MOBIC) 15 mg tablet TAKE 1 TABLET BY MOUTH EVERY DAY TAKE WITH FOOD    LANTUS SOLOSTAR U-100 INSULIN 100 unit/mL (3 mL) inpn 40 Units.  fluconazole (DIFLUCAN) 150 mg tablet Take 1 Tab by mouth daily. FDA advises cautious prescribing of oral fluconazole in pregnancy.  ondansetron (ZOFRAN ODT) 8 mg disintegrating tablet Take 1 Tab by mouth every eight (8) hours as needed for Nausea.  metoprolol succinate (TOPROL-XL) 100 mg tablet TAKE 1 TABLET BY MOUTH DAILY    JANUMET 50-1,000 mg per tablet TAKE 1 TAB BY MOUTH TWO (2) TIMES DAILY (WITH MEALS).  amLODIPine (NORVASC) 5 mg tablet TAKE 1 TABLET BY MOUTH DAILY.  Insulin Needles, Disposable, 31 gauge x 5/16\" ndle by SubCUTAneous route daily.  mupirocin (BACTROBAN) 2 % ointment Apply  to affected area daily.  DULoxetine (CYMBALTA) 60 mg capsule TAKE 1 CAPSULE EVERY DAY    cyclobenzaprine (FLEXERIL) 10 mg tablet Take 1 Tab by mouth nightly. (Patient taking differently: Take 10 mg by mouth two (2) times a day.)    montelukast (SINGULAIR) 10 mg tablet TAKE 1 TABLET EVERY DAY    albuterol (PROVENTIL HFA, VENTOLIN HFA, PROAIR HFA) 90 mcg/actuation inhaler Take 1 Puff by inhalation every six (6) hours as needed for Wheezing.  naproxen sodium (ALEVE) 220 mg tablet Take 220 mg by mouth two (2) times daily (with meals).  ranitidine (ZANTAC) 75 mg tablet Take 75 mg by mouth two (2) times a day.  nystatin (MYCOSTATIN) 100,000 unit/mL suspension Take 5 mL by mouth four (4) times daily. swish and spit    loratadine (CLARITIN) 10 mg tablet Take 10 mg by mouth daily.  cholecalciferol, vitamin d3, (VITAMIN D) 1,000 unit tablet Take 4,000 Units by mouth daily. Indications: PREVENTION OF VITAMIN D DEFICIENCY    albuterol (PROVENTIL VENTOLIN) 2.5 mg /3 mL (0.083 %) nebulizer solution 2.5 mg by Nebulization route every six (6) hours as needed. Indications: BRONCHOSPASM PREVENTION    MULTIVITS W-CA,FE,OTHER MIN (WOMEN'S DAILY MULTIVITAMIN PO) Take  by mouth two (2) days a week.     b complex-c-e-zn (STRESSTABS W ZINC) tablet Take 1 Tab by mouth as needed.  pravastatin (PRAVACHOL) 10 mg tablet TAKE 1/2 TABLET BY MOUTH NIGHTLY    oxyCODONE-acetaminophen (PERCOCET) 5-325 mg per tablet Take 1 Tab by mouth every four (4) hours as needed for Pain. Max Daily Amount: 6 Tabs.  diazePAM (VALIUM) 5 mg tablet Take 1 Tab by mouth every eight (8) hours as needed (muscle relaxation). Max Daily Amount: 15 mg.    gabapentin (NEURONTIN) 300 mg capsule TAKE 1 CAP BY MOUTH THREE (3) TIMES DAILY.  Liraglutide (VICTOZA) 0.6 mg/0.1 mL (18 mg/3 mL) sub-q pen SubQ: Initial: 0.6 mg once daily for 1 week; then increase to 1.2 mg once daily    aspirin delayed-release 81 mg tablet Take 1 Tab by mouth daily.  fluticasone (FLONASE) 50 mcg/actuation nasal spray 2 Sprays by Both Nostrils route daily.  losartan (COZAAR) 100 mg tablet TAKE 1 TABLET EVERY DAY    nabumetone (RELAFEN) 750 mg tablet Take 1 Tab by mouth two (2) times a day.  clotrimazole (LOTRIMIN) 1 % topical cream Apply  to affected area two (2) times a day. No current facility-administered medications on file prior to visit. Physical Exam  Vitals:    01/08/20 1533   BP: 142/88   Pulse: 100   SpO2: 100%   Weight: 99.8 kg (220 lb)   Height: 5' 3\" (1.6 m)       General:   Mental status: Awake, alert, cooperative. Neck: supple  Extremities: no edema      Neuro Exam:    CNs:   Facial movements: symmetric. Visual fields: normal bilaterally  EOM: conjugate eye movements bilateral    Hearing: normal   Speech: no aphasia, no dysarthria, normal fluency    Motor:  Moving all extremities without difficulty  Coordination: not tested  Sensory: not tested  Reflexes: not tested  Gait: not tested    Assessment:       ICD-10-CM ICD-9-CM    1.  Vasovagal syncope R55 780.2      Normal MRI Brain, EEG, and Carotid Dopplers  No further episodes  No indication for further neurologic testing  Pt will schedule neurology follow up if needed    Signed By: Jose Contreras MD     January 8, 2020

## 2020-02-20 ENCOUNTER — OFFICE VISIT (OUTPATIENT)
Dept: INTERNAL MEDICINE CLINIC | Age: 60
End: 2020-02-20

## 2020-02-20 VITALS
HEIGHT: 63 IN | SYSTOLIC BLOOD PRESSURE: 129 MMHG | HEART RATE: 93 BPM | WEIGHT: 223.3 LBS | BODY MASS INDEX: 39.57 KG/M2 | TEMPERATURE: 98.4 F | OXYGEN SATURATION: 98 % | DIASTOLIC BLOOD PRESSURE: 85 MMHG | RESPIRATION RATE: 16 BRPM

## 2020-02-20 DIAGNOSIS — E66.01 SEVERE OBESITY (HCC): ICD-10-CM

## 2020-02-20 DIAGNOSIS — M25.532 LEFT WRIST PAIN: ICD-10-CM

## 2020-02-20 DIAGNOSIS — E11.9 CONTROLLED TYPE 2 DIABETES MELLITUS WITHOUT COMPLICATION, WITH LONG-TERM CURRENT USE OF INSULIN (HCC): Primary | ICD-10-CM

## 2020-02-20 DIAGNOSIS — R21 RASH OF BODY: ICD-10-CM

## 2020-02-20 DIAGNOSIS — G89.29 CHRONIC RIGHT-SIDED LOW BACK PAIN WITH RIGHT-SIDED SCIATICA: ICD-10-CM

## 2020-02-20 DIAGNOSIS — M54.41 CHRONIC RIGHT-SIDED LOW BACK PAIN WITH RIGHT-SIDED SCIATICA: ICD-10-CM

## 2020-02-20 DIAGNOSIS — Z79.4 CONTROLLED TYPE 2 DIABETES MELLITUS WITHOUT COMPLICATION, WITH LONG-TERM CURRENT USE OF INSULIN (HCC): Primary | ICD-10-CM

## 2020-02-20 RX ORDER — AMLODIPINE BESYLATE 10 MG/1
TABLET ORAL DAILY
COMMUNITY
End: 2020-03-30 | Stop reason: SDUPTHER

## 2020-02-20 NOTE — PATIENT INSTRUCTIONS

## 2020-02-20 NOTE — PROGRESS NOTES
Chief Complaint   Patient presents with    Diabetes    Hypertension     she is a 61y.o. year old female who presents for follow-up of DM     Diabetes - Pt well controlled on janumet. is checking BS at home. denies hypoglycemia symptoms. has neuropathy symptoms. Last eye exam: last fall. Last foot exam: Last fall. Questionaire:  Diabetes Report Card   1) Have you seen the eye doctor in past year?yes    2) How would you  rate your Diabetic Diet? good   3) How well do you take care of your feet? good   4) Do you keep your Primary Care Follow Up Appts? yes    5) Do you know your A1C goal?yes    6) Do you take your medications daily? yes    7) Do you check your blood sugars? yes    8) Have you gained weight?no    9) Have you lost weight?no    10) Do you follow an exercise program?yes    11) Can you do better?yes      Pt who presents for follow up of a pre-existing problem of hypertension. Diet and Lifestyle: generally follows a low fat low cholesterol diet, generally follows a low sodium diet, exercises regularly, nonsmoker  Home BP Monitoring: is not measured at home  Use of agents associated with hypertension: none. Cardiovascular ROS: taking medications as instructed, no medication side effects noted, no TIA's, no chest pain on exertion, no dyspnea on exertion, no swelling of ankles. New concerns: none. Lab Results   Component Value Date/Time    Hemoglobin A1c 10.0 (H) 07/26/2016 03:40 PM     Lab Results   Component Value Date/Time    Cholesterol, total 178 09/21/2015 02:16 PM    HDL Cholesterol 38 (L) 09/21/2015 02:16 PM    LDL, calculated 110 (H) 09/21/2015 02:16 PM    Triglyceride 148 09/21/2015 02:16 PM     Lab Results   Component Value Date/Time    Microalb/Creat ratio (ug/mg creat.) 17.5 08/09/2016 04:47 PM         Reviewed and agree with Nurse Note and duplicated in this note. Reviewed PmHx, RxHx, FmHx, SocHx, AllgHx and updated and dated in the chart.     Family History   Problem Relation Age of Onset    Alcohol abuse Father     Cancer Father         prostate    Hypertension Father     Diabetes Father     Diabetes Mother     Hypertension Mother     Arthritis-osteo Mother     Alcohol abuse Brother     Cancer Maternal Uncle         colon    Stroke Paternal Uncle     Cancer Maternal Grandfather         lung ca       Past Medical History:   Diagnosis Date    Anxiety      AR (allergic rhinitis)      dr Gian caicedo allergy & asthma    Asthma      Cellulitis of face 6/2016    [facility]- right side of face    CTS (carpal tunnel syndrome)      DJD (degenerative joint disease) of lumbar spine     Fatty liver     Fibromyalgia     HBP (high blood pressure)      Hiatal hernia     Lumbar disc disease      Lumbar spondylosis 12/10/13    Peripheral edema      Radiculitis 12/10/13    lumbar    Trigger thumb of left hand 6/17/2013    Injection left thumb mid March 2013, Dr Luis Olea incontinence       Dr. 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[place] [employer]     Employer: [employer]   Tobacco Use    Smoking status: Never Smoker    Smokeless tobacco: Never Used   Substance and Sexual Activity    Alcohol use: No    Drug use: No    Sexual activity: Not Currently        Review of Systems - negative except as listed above      Objective:     Vitals:    02/20/20 1031   BP: 129/85   Pulse: 93   Resp: 16   Temp: 98.4 °F (36.9 °C)   TempSrc: Oral   SpO2: 98%   Weight: 223 lb 4.8 oz (101.3 kg)   Height: 5' 3\" (1.6 m)       Physical Examination: General appearance - alert, well appearing, and in no distress  Eyes - pupils equal and reactive, extraocular eye movements intact  Ears - bilateral TM's and external ear canals normal  Nose - normal and patent, no erythema, discharge or polyps  Mouth - mucous membranes moist, pharynx normal without lesions  Neck - supple, no significant adenopathy  Chest - clear to auscultation, no wheezes, rales or rhonchi, symmetric air entry  Heart - normal rate, regular rhythm, normal S1, S2, no murmurs, rubs, clicks or gallops  Abdomen - soft, nontender, nondistended, no masses or organomegaly  Back exam - negative straight-leg raise bilaterally , normal reflexes and strength bilateral lower extremities, sensory exam intact bilateral lower extremities  Musculoskeletal - left wrist -pain with palpation over flexor carpi ulnaris, no scaphoid pain, otherwise was normal  Extremities - peripheral pulses normal, no pedal edema, no clubbing or cyanosis  Skin -dark pigmented rash on chin, forehead, bilateral arms and thorax. Nonraised with few excoriations noted    Assessment/ Plan:   Diagnoses and all orders for this visit:    1. Controlled type 2 diabetes mellitus without complication, with long-term current use of insulin ([facility])  -     LIPID PANEL  -      DIABETES FOOT EXAM  -     HEMOGLOBIN A1C WITH EAG  -     MICROALBUMIN, UR, RAND W/ MICROALB/CREAT RATIO    2. Severe obesity ([address]    3. Left wrist pain  -     XR WRIST LT AP/LAT/OBL MIN 3V; Future  Flexor carpi ulnaris tendinitis  4. Chronic right-sided low back pain with right-sided sciatica  -     REFERRAL TO PHYSICAL THERAPY    5. Rash of body  -     REFERRAL TO DERMATOLOGY           . I have discussed the diagnosis with the patient and the intended plan as seen in the above orders. The patient has received an after-visit summary and questions were answered concerning future plans.      Medication Side Effects and Warnings were discussed with patient,  Patient Labs were reviewed and or requested, and  Patient Past Records were reviewed and or requested  yes         Pt agrees to call or return to clinic and/or go to closest ER with any worsening of symptoms. This may include, but not limited to increased fever (>100.4) with NSAIDS or Tylenol, increased edema, confusion, rash, worsening of presenting symptoms.

## 2020-02-21 LAB
ALBUMIN/CREAT UR: 7 MG/G CREAT (ref 0–29)
CHOLEST SERPL-MCNC: 175 MG/DL (ref 100–199)
CREAT UR-MCNC: 116.5 MG/DL
EST. AVERAGE GLUCOSE BLD GHB EST-MCNC: 194 MG/DL
HBA1C MFR BLD: 8.4 % (ref 4.8–5.6)
HDLC SERPL-MCNC: 35 MG/DL
LDLC SERPL CALC-MCNC: 102 MG/DL (ref 0–99)
MICROALBUMIN UR-MCNC: 7.9 UG/ML
TRIGL SERPL-MCNC: 189 MG/DL (ref 0–149)
VLDLC SERPL CALC-MCNC: 38 MG/DL (ref 5–40)

## 2020-02-21 NOTE — PROGRESS NOTES
Your sugars are elevated, please work on diet and exercise to help bring these down. Exercise least 30 minutes a day.   We will recheck in 3 months

## 2020-03-28 DIAGNOSIS — B37.0 CANDIDIASIS OF MOUTH: ICD-10-CM

## 2020-03-28 DIAGNOSIS — L08.9 SKIN INFECTION: ICD-10-CM

## 2020-03-30 RX ORDER — MUPIROCIN 20 MG/G
OINTMENT TOPICAL DAILY
Qty: 22 G | Refills: 0 | Status: SHIPPED | OUTPATIENT
Start: 2020-03-30 | End: 2020-12-18 | Stop reason: SDUPTHER

## 2020-03-30 RX ORDER — MONTELUKAST SODIUM 10 MG/1
10 TABLET ORAL DAILY
Qty: 90 TAB | Refills: 1 | Status: SHIPPED | OUTPATIENT
Start: 2020-03-30 | End: 2020-12-31 | Stop reason: SDUPTHER

## 2020-03-30 RX ORDER — DULOXETIN HYDROCHLORIDE 60 MG/1
60 CAPSULE, DELAYED RELEASE ORAL DAILY
Qty: 90 CAP | Refills: 0 | Status: SHIPPED | OUTPATIENT
Start: 2020-03-30 | End: 2020-07-11

## 2020-03-30 RX ORDER — METOPROLOL SUCCINATE 100 MG/1
100 TABLET, EXTENDED RELEASE ORAL DAILY
Qty: 135 TAB | Refills: 1 | Status: SHIPPED | OUTPATIENT
Start: 2020-03-30 | End: 2020-12-15

## 2020-03-30 RX ORDER — NYSTATIN 100000 [USP'U]/ML
1 SUSPENSION ORAL 4 TIMES DAILY
Qty: 240 ML | Refills: 1 | Status: SHIPPED | OUTPATIENT
Start: 2020-03-30 | End: 2021-08-16

## 2020-03-30 RX ORDER — ALBUTEROL SULFATE 90 UG/1
1 AEROSOL, METERED RESPIRATORY (INHALATION)
Qty: 1 INHALER | Refills: 4 | Status: SHIPPED | OUTPATIENT
Start: 2020-03-30 | End: 2021-04-19

## 2020-03-30 RX ORDER — AMLODIPINE BESYLATE 10 MG/1
10 TABLET ORAL DAILY
Qty: 30 TAB | Refills: 2 | Status: SHIPPED | OUTPATIENT
Start: 2020-03-30 | End: 2020-06-29

## 2020-06-11 ENCOUNTER — OFFICE VISIT (OUTPATIENT)
Dept: INTERNAL MEDICINE CLINIC | Age: 60
End: 2020-06-11

## 2020-06-11 VITALS
DIASTOLIC BLOOD PRESSURE: 82 MMHG | HEART RATE: 82 BPM | BODY MASS INDEX: 39.51 KG/M2 | OXYGEN SATURATION: 97 % | TEMPERATURE: 98.4 F | SYSTOLIC BLOOD PRESSURE: 128 MMHG | HEIGHT: 63 IN | WEIGHT: 223 LBS | RESPIRATION RATE: 16 BRPM

## 2020-06-11 DIAGNOSIS — Z12.39 BREAST CANCER SCREENING: ICD-10-CM

## 2020-06-11 DIAGNOSIS — Z79.4 CONTROLLED TYPE 2 DIABETES MELLITUS WITHOUT COMPLICATION, WITH LONG-TERM CURRENT USE OF INSULIN (HCC): Primary | ICD-10-CM

## 2020-06-11 DIAGNOSIS — E11.9 CONTROLLED TYPE 2 DIABETES MELLITUS WITHOUT COMPLICATION, WITH LONG-TERM CURRENT USE OF INSULIN (HCC): Primary | ICD-10-CM

## 2020-06-11 DIAGNOSIS — F41.9 ANXIETY: ICD-10-CM

## 2020-06-11 NOTE — PROGRESS NOTES
Chief Complaint   Patient presents with    Diabetes     she is a 61y.o. year old female who presents for follow-up of dm    Diabetes - Pt well controlled. is checking BS at home. denies hypoglycemia symptoms. has neuropathy symptoms. Last eye exam .  Last foot exam last office visit. Questionaire:  Diabetes Report Card   1) Have you seen the eye doctor in past year?yes    2) How would you  rate your Diabetic Diet? good   3) How well do you take care of your feet? good   4) Do you keep your Primary Care Follow Up Appts? yes    5) Do you know your A1C goal?yes    6) Do you take your medications daily? yes    7) Do you check your blood sugars? yes    8) Have you gained weight?yes    9) Have you lost weight?no    10) Do you follow an exercise program?no    11) Can you do better? yes            Lab Results   Component Value Date/Time    Hemoglobin A1c 8.4 (H) 02/20/2020 11:34 AM     Lab Results   Component Value Date/Time    Cholesterol, total 175 02/20/2020 11:34 AM    HDL Cholesterol 35 (L) 02/20/2020 11:34 AM    LDL, calculated 102 (H) 02/20/2020 11:34 AM    Triglyceride 189 (H) 02/20/2020 11:34 AM     Lab Results   Component Value Date/Time    Microalb/Creat ratio (ug/mg creat.) 7 02/20/2020 11:34 AM         Reviewed and agree with Nurse Note and duplicated in this note. Reviewed PmHx, RxHx, FmHx, SocHx, AllgHx and updated and dated in the chart.     Family History   Problem Relation Age of Onset    Alcohol abuse Father     Cancer Father         prostate    Hypertension Father     Diabetes Father     Diabetes Mother     Hypertension Mother     Arthritis-osteo Mother     Alcohol abuse Brother     Cancer Maternal Uncle         colon    Stroke Paternal Uncle     Cancer Maternal Grandfather         lung ca       Past Medical History:   Diagnosis Date    Anxiety      AR (allergic rhinitis)      dr Darrick caicedo allergy & asthma    Asthma      Cellulitis of face 6/2016    [facility]- right side of face    CTS (carpal tunnel syndrome)      DJD (degenerative joint disease) of lumbar spine     Fatty liver     Fibromyalgia     HBP (high blood pressure)      Hiatal hernia     Lumbar disc disease      Lumbar spondylosis 12/10/13    Peripheral edema      Radiculitis 12/10/13    lumbar    Trigger thumb of left hand 6/17/2013    Injection left thumb mid March 2013, Dr Sanches Basket incontinence       Dr. 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[place] [employer]     Employer: [employer]   Tobacco Use    Smoking status: Never Smoker    Smokeless tobacco: Never Used   Substance and Sexual Activity    Alcohol use: No    Drug use: No    Sexual activity: Not Currently        Review of Systems - negative except as listed above      Objective:     Vitals:    06/11/20 1341   BP: 128/82   Pulse: 82   Resp: 16   Temp: 98.4 °F (36.9 °C)   TempSrc: Oral   SpO2: 97%   Weight: 223 lb (101.2 kg)   Height: 5' 3\" (1.6 m)       Physical Examination: General appearance - alert, well appearing, and in no distress  Eyes - pupils equal and reactive, extraocular eye movements intact  Ears - bilateral TM's and external ear canals normal  Nose - normal and patent, no erythema, discharge or polyps  Mouth - mucous membranes moist, pharynx normal without lesions  Neck - supple, no significant adenopathy  Chest - clear to auscultation, no wheezes, rales or rhonchi, symmetric air entry  Heart - normal rate, regular rhythm, normal S1, S2, no murmurs, rubs, clicks or gallops  Abdomen - soft, nontender, nondistended, no masses or organomegaly  Back exam - full range of motion, no tenderness, palpable spasm or pain on motion  Neurological - alert, oriented, normal speech, no focal findings or movement disorder noted  Musculoskeletal - no joint tenderness, deformity or swelling  Extremities - peripheral pulses normal, no pedal edema, no clubbing or cyanosis  Skin - normal coloration and turgor, no rashes, no suspicious skin lesions noted     Assessment/ Plan:   Diagnoses and all orders for this visit:    1. Controlled type 2 diabetes mellitus without complication, with long-term current use of insulin (HCC)  -     REFERRAL TO OPHTHALMOLOGY  -     HEMOGLOBIN A1C WITH EAG  -     METABOLIC PANEL, COMPREHENSIVE    2. Anxiety  -     REFERRAL TO PSYCHIATRY    3. Breast cancer screening  -     [facility] MAMMO BI SCREENING INCL CAD; Future    Other orders  -     SITagliptin-metFORMIN (JANUMET)  mg per tablet; Take 1 Tab by mouth two (2) times daily (with meals). I have reviewed/discussed the above normal BMI with the patient. I have recommended the following interventions: dietary management education, guidance, and counseling . .       I have discussed the diagnosis with the patient and the intended plan as seen in the above orders. The patient has received an after-visit summary and questions were answered concerning future plans. Medication Side Effects and Warnings were discussed with patient,  Patient Labs were reviewed and or requested, and  Patient Past Records were reviewed and or requested  yes         Pt agrees to call or return to clinic and/or go to closest ER with any worsening of symptoms. This may include, but not limited to increased fever (>100.4) with NSAIDS or Tylenol, increased edema, confusion, rash, worsening of presenting symptoms. Please note that this dictation was completed with [employer], the computer voice recognition software. Quite often unanticipated grammatical, syntax, homophones, and other interpretive errors are inadvertently transcribed by the computer software. Please disregard these errors.   Please excuse any errors that have escaped final proofreading. Thank you.

## 2020-06-12 LAB
ALBUMIN SERPL-MCNC: 4.4 G/DL (ref 3.8–4.9)
ALBUMIN/GLOB SERPL: 1.8 {RATIO} (ref 1.2–2.2)
ALP SERPL-CCNC: 70 IU/L (ref 39–117)
ALT SERPL-CCNC: 38 IU/L (ref 0–32)
AST SERPL-CCNC: 32 IU/L (ref 0–40)
BILIRUB SERPL-MCNC: 0.4 MG/DL (ref 0–1.2)
BUN SERPL-MCNC: 16 MG/DL (ref 6–24)
BUN/CREAT SERPL: 19 (ref 9–23)
CALCIUM SERPL-MCNC: 10.2 MG/DL (ref 8.7–10.2)
CHLORIDE SERPL-SCNC: 109 MMOL/L (ref 96–106)
CO2 SERPL-SCNC: 21 MMOL/L (ref 20–29)
CREAT SERPL-MCNC: 0.85 MG/DL (ref 0.57–1)
EST. AVERAGE GLUCOSE BLD GHB EST-MCNC: 246 MG/DL
GLOBULIN SER CALC-MCNC: 2.5 G/DL (ref 1.5–4.5)
GLUCOSE SERPL-MCNC: 205 MG/DL (ref 65–99)
HBA1C MFR BLD: 10.2 % (ref 4.8–5.6)
POTASSIUM SERPL-SCNC: 4.6 MMOL/L (ref 3.5–5.2)
PROT SERPL-MCNC: 6.9 G/DL (ref 6–8.5)
SODIUM SERPL-SCNC: 144 MMOL/L (ref 134–144)

## 2020-06-15 ENCOUNTER — HOSPITAL ENCOUNTER (OUTPATIENT)
Dept: MAMMOGRAPHY | Age: 60
Discharge: HOME | End: 2020-06-15
Attending: FAMILY MEDICINE
Payer: COMMERCIAL

## 2020-06-15 DIAGNOSIS — Z12.39 BREAST CANCER SCREENING: ICD-10-CM

## 2020-06-15 PROCEDURE — 77067 SCR MAMMO BI INCL CAD: CPT

## 2020-06-29 RX ORDER — AMLODIPINE BESYLATE 10 MG/1
TABLET ORAL
Qty: 90 TAB | Refills: 0 | Status: SHIPPED | OUTPATIENT
Start: 2020-06-29 | End: 2020-09-22

## 2020-07-11 RX ORDER — DULOXETIN HYDROCHLORIDE 60 MG/1
CAPSULE, DELAYED RELEASE ORAL
Qty: 90 CAP | Refills: 0 | Status: SHIPPED | OUTPATIENT
Start: 2020-07-11 | End: 2020-12-22

## 2020-09-22 RX ORDER — AMLODIPINE BESYLATE 10 MG/1
TABLET ORAL
Qty: 90 TAB | Refills: 0 | Status: SHIPPED | OUTPATIENT
Start: 2020-09-22 | End: 2020-12-22 | Stop reason: SDUPTHER

## 2020-12-17 ENCOUNTER — OFFICE VISIT (OUTPATIENT)
Dept: INTERNAL MEDICINE CLINIC | Age: 60
End: 2020-12-17
Payer: COMMERCIAL

## 2020-12-17 VITALS
TEMPERATURE: 98 F | WEIGHT: 231 LBS | SYSTOLIC BLOOD PRESSURE: 158 MMHG | HEART RATE: 99 BPM | HEIGHT: 63 IN | OXYGEN SATURATION: 96 % | DIASTOLIC BLOOD PRESSURE: 100 MMHG | BODY MASS INDEX: 40.93 KG/M2 | RESPIRATION RATE: 16 BRPM

## 2020-12-17 DIAGNOSIS — R00.2 PALPITATIONS: ICD-10-CM

## 2020-12-17 DIAGNOSIS — R60.0 EDEMA OF LEG: ICD-10-CM

## 2020-12-17 DIAGNOSIS — E11.65 UNCONTROLLED TYPE 2 DIABETES MELLITUS WITH HYPERGLYCEMIA (HCC): Primary | ICD-10-CM

## 2020-12-17 DIAGNOSIS — I10 ESSENTIAL HYPERTENSION WITH GOAL BLOOD PRESSURE LESS THAN 130/80: ICD-10-CM

## 2020-12-17 PROCEDURE — 99214 OFFICE O/P EST MOD 30 MIN: CPT | Performed by: FAMILY MEDICINE

## 2020-12-17 RX ORDER — HYDROCHLOROTHIAZIDE 25 MG/1
25 TABLET ORAL DAILY
Qty: 30 TAB | Refills: 3 | Status: SHIPPED | OUTPATIENT
Start: 2020-12-17 | End: 2021-08-16

## 2020-12-17 NOTE — PROGRESS NOTES
Chief Complaint   Patient presents with    Diabetes     Patient is here for a diabetes follow up. she is a 61y.o. year old female who presents for follow-up of diabetes     Diabetes - Pt well controlled on lantus. is checking BS at home. has hypoglycemia symptoms. has neuropathy symptoms. Last eye exam unknown  . Last foot exam July of 2020. Questionaire:  Diabetes Report Card   1) Have you seen the eye doctor in past year?yes    2) How would you  rate your Diabetic Diet?no   3) How well do you take care of your feet? yes   4) Do you keep your Primary Care Follow Up Appts? yes    5) Do you know your A1C goal?yes    6) Do you take your medications daily? yes    7) Do you check your blood sugars? yes    8) Have you gained weight?yes    9) Have you lost weight?no    10) Do you follow an exercise program?no    11) Can you do better? yes            Lab Results   Component Value Date/Time    Hemoglobin A1c 10.2 (H) 06/11/2020 03:28 PM     Lab Results   Component Value Date/Time    Cholesterol, total 175 02/20/2020 11:34 AM    HDL Cholesterol 35 (L) 02/20/2020 11:34 AM    LDL, calculated 102 (H) 02/20/2020 11:34 AM    Triglyceride 189 (H) 02/20/2020 11:34 AM     Lab Results   Component Value Date/Time    Microalb/Creat ratio (ug/mg creat.) 7 02/20/2020 11:34 AM         Reviewed and agree with Nurse Note and duplicated in this note. Reviewed PmHx, RxHx, FmHx, SocHx, AllgHx and updated and dated in the chart.     Family History   Problem Relation Age of Onset    Alcohol abuse Father     Cancer Father         prostate    Hypertension Father     Diabetes Father     Diabetes Mother     Hypertension Mother     Arthritis-osteo Mother     Alcohol abuse Brother     Cancer Maternal Uncle         colon    Stroke Paternal Uncle     Cancer Maternal Grandfather         lung ca    Breast Cancer Maternal Grandmother     Breast Cancer Paternal Grandmother        Past Medical History:   Diagnosis Date    Anxiety      AR (allergic rhinitis)      dr Pedro Samuels [place] allergy & asthma    Asthma      Cellulitis of face 6/2016    [facility]- right side of face    CTS (carpal tunnel syndrome)      DJD (degenerative joint disease) of lumbar spine     Fatty liver     Fibromyalgia     HBP (high blood pressure)      Hiatal hernia     Lumbar disc disease      Lumbar spondylosis 12/10/13    Peripheral edema      Radiculitis 12/10/13    lumbar    Trigger thumb of left hand 6/17/2013    Injection left thumb mid March 2013, Dr Rudolph Andover incontinence       Dr. 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[employer]     Employer: [employer]   Tobacco Use    Smoking status: Never Smoker    Smokeless tobacco: Never Used   Substance and Sexual Activity    Alcohol use: No    Drug use: No    Sexual activity: Not Currently        Review of Systems - negative except as listed above      Objective:     Vitals:    12/17/20 1517   BP: (!) 158/100   Pulse: 99   Resp: 16   Temp: 98 °F (36.7 °C)   SpO2: 96%   Weight: 231 lb (104.8 kg)   Height: 5' 3\" (1.6 m)       Physical Examination: General appearance - alert, well appearing, and in no distress  Eyes - pupils equal and reactive, extraocular eye movements intact  Ears - bilateral TM's and external ear canals normal  Nose - normal and patent, no erythema, discharge or polyps  Mouth - mucous membranes moist, pharynx normal without lesions  Neck - supple, no significant adenopathy  Chest - clear to auscultation, no wheezes, rales or rhonchi, symmetric air entry  Heart - normal rate, regular rhythm, normal S1, S2, no murmurs, rubs, clicks or gallops  Abdomen - soft, nontender, nondistended, no masses or organomegaly  Neurological - alert, oriented, normal speech, no focal findings or movement disorder noted  Musculoskeletal - no joint tenderness, deformity or swelling  Extremities - peripheral pulses normal, no pedal edema, no clubbing or cyanosis  Skin - DERMATITIS NOTED: Circular excoriations noted with no pus or drainage on back    Assessment/ Plan:   Diagnoses and all orders for this visit:    1. Uncontrolled type 2 diabetes mellitus with hyperglycemia (HCC)  -     HEMOGLOBIN A1C WITH EAG; Future    2. Palpitations  -     REFERRAL TO CARDIOLOGY    3. Edema of leg  -     REFERRAL TO CARDIOLOGY  -     METABOLIC PANEL, BASIC; Future    4. Essential hypertension with goal blood pressure less than 750/10  -     METABOLIC PANEL, BASIC; Future    Other orders  -     hydroCHLOROthiazide (HYDRODIURIL) 25 mg tablet; Take 1 Tab by mouth daily. Patient will follow up in 2 weeks for blood pressure recheck    . I have discussed the diagnosis with the patient and the intended plan as seen in the above orders. The patient has received an after-visit summary and questions were answered concerning future plans. Medication Side Effects and Warnings were discussed with patient,  Patient Labs were reviewed and or requested, and  Patient Past Records were reviewed and or requested  yes         Pt agrees to call or return to clinic and/or go to closest ER with any worsening of symptoms. This may include, but not limited to increased fever (>100.4) with NSAIDS or Tylenol, increased edema, confusion, rash, worsening of presenting symptoms. Please note that this dictation was completed with [employer], the computer voice recognition software. Quite often unanticipated grammatical, syntax, homophones, and other interpretive errors are inadvertently transcribed by the computer software. Please disregard these errors. Please excuse any errors that have escaped final proofreading. Thank you.

## 2020-12-18 DIAGNOSIS — L08.9 SKIN INFECTION: ICD-10-CM

## 2020-12-18 DIAGNOSIS — M79.7 FIBROMYALGIA: ICD-10-CM

## 2020-12-18 LAB
ANION GAP SERPL CALC-SCNC: 9 MMOL/L (ref 5–15)
BUN SERPL-MCNC: 15 MG/DL (ref 6–20)
BUN/CREAT SERPL: 16 (ref 12–20)
CALCIUM SERPL-MCNC: 9.5 MG/DL (ref 8.5–10.1)
CHLORIDE SERPL-SCNC: 105 MMOL/L (ref 97–108)
CO2 SERPL-SCNC: 26 MMOL/L (ref 21–32)
CREAT SERPL-MCNC: 0.93 MG/DL (ref 0.55–1.02)
EST. AVERAGE GLUCOSE BLD GHB EST-MCNC: 189 MG/DL
GLUCOSE SERPL-MCNC: 150 MG/DL (ref 65–100)
HBA1C MFR BLD: 8.2 % (ref 4–5.6)
POTASSIUM SERPL-SCNC: 4 MMOL/L (ref 3.5–5.1)
SODIUM SERPL-SCNC: 140 MMOL/L (ref 136–145)

## 2020-12-18 RX ORDER — INSULIN GLARGINE 100 [IU]/ML
INJECTION, SOLUTION SUBCUTANEOUS
COMMUNITY
Start: 2018-12-01 | End: 2020-12-18 | Stop reason: SDUPTHER

## 2020-12-18 RX ORDER — HYDROXYZINE 50 MG/1
50 TABLET, FILM COATED ORAL
Qty: 30 TAB | Refills: 2 | Status: SHIPPED | OUTPATIENT
Start: 2020-12-18 | End: 2021-08-02

## 2020-12-18 RX ORDER — INSULIN GLARGINE 100 [IU]/ML
40 INJECTION, SOLUTION SUBCUTANEOUS
Qty: 36 ML | Refills: 2 | Status: SHIPPED | OUTPATIENT
Start: 2020-12-18 | End: 2021-04-25 | Stop reason: SDUPTHER

## 2020-12-18 RX ORDER — MUPIROCIN 20 MG/G
OINTMENT TOPICAL DAILY
Qty: 22 G | Refills: 0 | Status: SHIPPED | OUTPATIENT
Start: 2020-12-18 | End: 2021-04-19

## 2020-12-18 RX ORDER — CYCLOBENZAPRINE HCL 10 MG
10 TABLET ORAL
Qty: 30 TAB | Refills: 0 | Status: SHIPPED | OUTPATIENT
Start: 2020-12-18 | End: 2021-03-15

## 2020-12-22 RX ORDER — DULOXETIN HYDROCHLORIDE 60 MG/1
CAPSULE, DELAYED RELEASE ORAL
Qty: 90 CAP | Refills: 0 | Status: SHIPPED | OUTPATIENT
Start: 2020-12-22 | End: 2021-03-15

## 2020-12-22 RX ORDER — AMLODIPINE BESYLATE 10 MG/1
10 TABLET ORAL DAILY
Qty: 90 TAB | Refills: 0 | Status: SHIPPED | OUTPATIENT
Start: 2020-12-22 | End: 2021-06-07

## 2020-12-31 ENCOUNTER — OFFICE VISIT (OUTPATIENT)
Dept: INTERNAL MEDICINE CLINIC | Age: 60
End: 2020-12-31
Payer: COMMERCIAL

## 2020-12-31 VITALS
SYSTOLIC BLOOD PRESSURE: 123 MMHG | HEART RATE: 89 BPM | WEIGHT: 236.4 LBS | BODY MASS INDEX: 41.89 KG/M2 | OXYGEN SATURATION: 96 % | DIASTOLIC BLOOD PRESSURE: 79 MMHG | RESPIRATION RATE: 14 BRPM | TEMPERATURE: 98.8 F | HEIGHT: 63 IN

## 2020-12-31 DIAGNOSIS — I10 ESSENTIAL HYPERTENSION WITH GOAL BLOOD PRESSURE LESS THAN 130/80: Primary | ICD-10-CM

## 2020-12-31 PROCEDURE — 99214 OFFICE O/P EST MOD 30 MIN: CPT | Performed by: FAMILY MEDICINE

## 2020-12-31 RX ORDER — MONTELUKAST SODIUM 10 MG/1
10 TABLET ORAL DAILY
Qty: 90 TAB | Refills: 1 | Status: SHIPPED | OUTPATIENT
Start: 2020-12-31 | End: 2021-08-04

## 2020-12-31 NOTE — PROGRESS NOTES
Chief Complaint   Patient presents with    Hypertension     Pt who presents for follow up of a pre-existing problem of hypertension. Diet and Lifestyle: generally follows a low fat low cholesterol diet, generally follows a low sodium diet, exercises sporadically, nonsmoker, no alcohol no caffeine   Home BP Monitoring: is borderline controlled at home, ranging 140's/90's  Use of agents associated with hypertension: none. Cardiovascular ROS: taking medications as instructed, no medication side effects noted, no TIA's, no chest pain on exertion, no dyspnea on exertion, no swelling of ankles. New concerns: SOB. Reviewed and agree with Nurse Note and duplicated in this note. Reviewed PmHx, RxHx, FmHx, SocHx, AllgHx and updated and dated in the chart.     Family History   Problem Relation Age of Onset    Alcohol abuse Father     Cancer Father         prostate    Hypertension Father     Diabetes Father     Diabetes Mother     Hypertension Mother    [facility] Arthritis-osteo Mother     Alcohol abuse Brother     Cancer Maternal Uncle         colon    Stroke Paternal Uncle     Cancer Maternal Grandfather         lung ca    Breast Cancer Maternal Grandmother     Breast Cancer Paternal Grandmother        Past Medical History:   Diagnosis Date    Anxiety      AR (allergic rhinitis)      dr Sumeet caicedo allergy & asthma    Asthma      Cellulitis of face 6/2016    [facility]- right side of face    CTS (carpal tunnel syndrome)      DJD (degenerative joint disease) of lumbar spine     Fatty liver     Fibromyalgia     HBP (high blood pressure)      Hiatal hernia     Lumbar disc disease      Lumbar spondylosis 12/10/13    Peripheral edema      Radiculitis 12/10/13    lumbar    Trigger thumb of left hand 6/17/2013    Injection left thumb mid March 2013, Dr Vijay Hayden incontinence       Dr. 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   Tobacco Use    Smoking status: Never Smoker    Smokeless tobacco: Never Used   Substance and Sexual Activity    Alcohol use: No    Drug use: No    Sexual activity: Not Currently        Review of Systems - negative except as listed above      Objective:     Vitals:    12/31/20 0959   BP: 123/79   Pulse: 89   Resp: 14   Temp: 98.8 °F (37.1 °C)   TempSrc: Oral   SpO2: 96%   Weight: 236 lb 6.4 oz (107.2 kg)   Height: 5' 3\" (1.6 m)       Physical Examination: General appearance - alert, well appearing, and in no distress  Eyes - pupils equal and reactive, extraocular eye movements intact  Ears - bilateral TM's and external ear canals normal  Nose - normal and patent, no erythema, discharge or polyps  Mouth - mucous membranes moist, pharynx normal without lesions  Neck - supple, no significant adenopathy  Chest - clear to auscultation, no wheezes, rales or rhonchi, symmetric air entry  Heart - normal rate, regular rhythm, normal S1, S2, no murmurs, rubs, clicks or gallops  Abdomen - soft, nontender, nondistended, no masses or organomegaly  Neurological - alert, oriented, normal speech, no focal findings or movement disorder noted  Musculoskeletal - no joint tenderness, deformity or swelling  Extremities - peripheral pulses normal, no pedal edema, no clubbing or cyanosis  Skin - normal coloration and turgor, no rashes, no suspicious skin lesions noted     Assessment/ Plan:   Diagnoses and all orders for this visit:    1. Essential hypertension with goal blood pressure less than 130/80    Other orders  -     montelukast (SINGULAIR) 10 mg tablet; Take 1 Tab by mouth daily.     Current medication dosing seems to be working well, will continue and follow-up in 3 months  Patient will continue with blood pressure monitoring at home            Medication Side Effects and Warnings were discussed with patient,  Patient Labs were reviewed and or requested, and  Patient Past Records were reviewed and or requested  yes       I have discussed the diagnosis with the patient and the intended plan as seen in the above orders. The patient has received an after-visit summary and questions were answered concerning future plans. Pt agrees to call or return to clinic and/or go to closest ER with any worsening of symptoms. This may include, but not limited to increased fever (>100.4) with NSAIDS or Tylenol, increased edema, confusion, rash, worsening of presenting symptoms. Please note that this dictation was completed with [employer], the computer voice recognition software. Quite often unanticipated grammatical, syntax, homophones, and other interpretive errors are inadvertently transcribed by the computer software. Please disregard these errors. Please excuse any errors that have escaped final proofreading. Thank you.

## 2021-03-12 ENCOUNTER — OFFICE VISIT (OUTPATIENT)
Dept: CARDIOLOGY CLINIC | Age: 61
End: 2021-03-12
Payer: COMMERCIAL

## 2021-03-12 VITALS
OXYGEN SATURATION: 99 % | DIASTOLIC BLOOD PRESSURE: 90 MMHG | HEART RATE: 97 BPM | WEIGHT: 236 LBS | SYSTOLIC BLOOD PRESSURE: 142 MMHG | HEIGHT: 63 IN | BODY MASS INDEX: 41.82 KG/M2

## 2021-03-12 DIAGNOSIS — R07.9 CHEST PAIN, UNSPECIFIED TYPE: ICD-10-CM

## 2021-03-12 DIAGNOSIS — R00.2 PALPITATIONS: ICD-10-CM

## 2021-03-12 DIAGNOSIS — I10 ESSENTIAL HYPERTENSION: Primary | ICD-10-CM

## 2021-03-12 PROCEDURE — 99244 OFF/OP CNSLTJ NEW/EST MOD 40: CPT | Performed by: INTERNAL MEDICINE

## 2021-03-12 PROCEDURE — 93000 ELECTROCARDIOGRAM COMPLETE: CPT | Performed by: INTERNAL MEDICINE

## 2021-03-12 RX ORDER — DICLOFENAC POTASSIUM 50 MG/1
50 TABLET, FILM COATED ORAL 3 TIMES DAILY
COMMUNITY
End: 2021-03-12 | Stop reason: ALTCHOICE

## 2021-03-12 NOTE — PROGRESS NOTES
Caroline Max is a 61 y.o. female    Visit Vitals  BP (!) 142/90 (BP 1 Location: Left upper arm, BP Patient Position: Sitting)   Pulse 97   Ht 5' 3\" (1.6 m)   Wt 236 lb (107 kg)   SpO2 99%   BMI 41.81 kg/m²       Chief Complaint   Patient presents with    Palpitations    Hypertension       Chest pain YES  SOB YES  Dizziness NO  Swelling ANKLE RIGHT MORE THAN LEFT  Recent hospital visit NO  Refills NO

## 2021-03-12 NOTE — PROGRESS NOTES
All orders entered per VO of Dr. Nigel Quiles:      7 DAY loop recorder for Palpitations. Echo -for chest pain, palpitations. Lexiscan stress Nuc- Chest pain. See Dr. Nigel Quiles in 1 month.      Message sent to Estefany Neri for 7 day holter

## 2021-03-12 NOTE — PATIENT INSTRUCTIONS
7 DAY loop recorder for Palpitations. Echo -for chest pain, palpitations. Lexiscan stress Nuc- Chest pain. See Dr. Nigel Qulies in 1 month.

## 2021-03-12 NOTE — PROGRESS NOTES
Reason for Consult: Palpitations. Referring: Kaela Cyr MD    HPI: Vaishnavi Gibson is a 61 y.o. female with past medical history significant for hypertension, diabetes, dyslipidemia, obesity, is here for evaluation of symptoms of palpitation, chest pressure, fatigue and shortness of breath. She woke up this morning and felt heart racing in her chest.  She applied the pulse ox meter to her fingers and noticed that her heart rate reported was 120 bpm and her pulse ox was 92%. She went to the bathroom and felt lightheaded and also felt chest pressure in her left anterior chest wall. The chest pressure was nonradiating. Symptoms gradually improved however she felt significantly fatigued since then. She has been having symptoms of fatigue and chest pressure and palpitations off and on for the last few weeks. No previous cardiac history however she has significant family history of heart disease including her mother father and uncles had CAD, PCI as well as bypass surgery. EKG in our office today demonstrated normal sinus rhythm, normal axis, normal intervals, normal ST segment. Lab results from December 2020 were personally reviewed. Lab results demonstrate normal CBC, normal CMP. Plan:    1. Palpitations: Symptoms are unusual given that she currently takes Toprol- mg p.o. daily. However need to rule out if there is any arrhythmias. We will do a 7 Day Loop recorder. Check Echo. Continue Toprol-XL for now. 2.  Chest pain: Symptoms suggestive of angina or angina equivalent. Strong family history and personal history of diabetes. Check Lexiscan stress nuclear study. 3.  Hypertension: Blood pressure is slightly elevated. Continue amlodipine, Toprol-XL, HCTZ. Watch salt intake. Increase aerobic activity. 4. See Dr. Trixie Bethea  In 1 month. ATTENTION:   This medical record was transcribed using an electronic medical records/speech recognition system.   Although proofread, it may and can contain electronic, spelling and other errors. Corrections may be executed at a later time. Please feel free to contact us for any clarifications as needed.       Past Medical History:   Diagnosis Date    Anxiety      AR (allergic rhinitis)      dr Devante caicedo allergy & asthma    Asthma      Cellulitis of face 6/2016    [facility]- right side of face    CTS (carpal tunnel syndrome)      DJD (degenerative joint disease) of lumbar spine     Fatty liver     Fibromyalgia     HBP (high blood pressure)      Hiatal hernia     Lumbar disc disease      Lumbar spondylosis 12/10/13    Peripheral edema      Radiculitis 12/10/13    lumbar    Trigger thumb of left hand 6/17/2013    Injection left thumb mid March 2013, Dr Kelton Knapp incontinence       Dr. Laith Dorantes            Past Surgical History:   Procedure Laterality Date    COLONOSCOPY      nov 2010; normal    HX GI      cholestcystec    HX GYN      bladder neck    HX HERNIA REPAIR      hiatal hernia    HX ORTHOPAEDIC      carpal and tarsal tunnel released    HX ORTHOPAEDIC  4/29/2013    2nd and 4th finger, right hand, trigger release             Family History   Problem Relation Age of Onset    Alcohol abuse Father     Cancer Father         prostate    Hypertension Father     Diabetes Father     Diabetes Mother     Hypertension Mother     Arthritis-osteo Mother     Alcohol abuse Brother     Cancer Maternal Uncle         colon    Stroke Paternal Uncle     Cancer Maternal Grandfather         lung ca    Breast Cancer Maternal Grandmother     Breast Cancer Paternal Grandmother            Social History     Socioeconomic History    Marital status:      Spouse name: Not on file    Number of children: Not on file    Years of education: Not on file    Highest education level: Not on file   Occupational History    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[employer]     Employer: department of  [employer]    Financial resource strain: Not on file    Food insecurity     Worry: Not on file     Inability: Not on file    Transportation needs     Medical: Not on file     Non-medical: Not on file   Tobacco Use    Smoking status: Never Smoker    Smokeless tobacco: Never Used   Substance and Sexual Activity    Alcohol use: No    Drug use: No    Sexual activity: Not Currently   Lifestyle    Physical activity     Days per week: Not on file     Minutes per session: Not on file    Stress: Not on file   Relationships    Social connections     Talks on phone: Not on file     Gets together: Not on file     Attends Anglican service: Not on file     Active member of club or organization: Not on file     Attends meetings of clubs or organizations: Not on file     Relationship status: Not on file    Intimate partner violence     Fear of current or ex partner: Not on file     Emotionally abused: Not on file     Physically abused: Not on file     Forced sexual activity: Not on file   Other Topics Concern    Not on file   Social History Narrative    Not on file         Allergies   Allergen Reactions    Ace Inhibitors Cough    Atorvastatin Other (comments)     Leg cramps    Marcaine [Bupivacaine] Other (comments)     Low BP    Sulfa (Sulfonamide Antibiotics) Rash            Current Outpatient Medications   Medication Sig Dispense Refill    montelukast (SINGULAIR) 10 mg tablet Take 1 Tab by mouth daily. 90 Tab 1    amLODIPine (NORVASC) 10 mg tablet Take 1 Tab by mouth daily. 90 Tab 0    DULoxetine (CYMBALTA) 60 mg capsule TAKE 1 CAPSULE BY MOUTH EVERY DAY 90 Cap 0    cyclobenzaprine (FLEXERIL) 10 mg tablet Take 1 Tab by mouth nightly. 30 Tab 0    mupirocin (BACTROBAN) 2 % ointment Apply  to affected area daily. 22 g 0    insulin glargine (Lantus Solostar U-100 Insulin) 100 unit/mL (3 mL) inpn 40 Units by SubCUTAneous route nightly for 90 days.  36 mL 2    hydroCHLOROthiazide (HYDRODIURIL) 25 mg tablet Take 1 Tab by mouth daily. 30 Tab 3    metoprolol succinate (TOPROL-XL) 100 mg tablet TAKE 1 TABLET BY MOUTH EVERY DAY 90 Tab 2    nystatin (MYCOSTATIN) 100,000 unit/mL suspension Take 5 mL by mouth four (4) times daily. swish and spit 240 mL 1    albuterol (PROVENTIL HFA, VENTOLIN HFA, PROAIR HFA) 90 mcg/actuation inhaler Take 1 Puff by inhalation every six (6) hours as needed for Wheezing. 1 Inhaler 4    ondansetron (ZOFRAN ODT) 8 mg disintegrating tablet Take 1 Tab by mouth every eight (8) hours as needed for Nausea. 15 Tab 0    JANUMET 50-1,000 mg per tablet TAKE 1 TAB BY MOUTH TWO (2) TIMES DAILY (WITH MEALS). 60 Tab 6    loratadine (CLARITIN) 10 mg tablet Take 10 mg by mouth daily.  clotrimazole (LOTRIMIN) 1 % topical cream Apply  to affected area two (2) times a day. 30 g 1    cholecalciferol (Vitamin D3) (1000 Units /25 mcg) tablet Take 4,000 Units by mouth daily. Indications: prevention of vitamin D deficiency      albuterol (PROVENTIL VENTOLIN) 2.5 mg /3 mL (0.083 %) nebulizer solution 2.5 mg by Nebulization route every six (6) hours as needed. Indications: BRONCHOSPASM PREVENTION      MULTIVITS W-CA,FE,OTHER MIN (WOMEN'S DAILY MULTIVITAMIN PO) Take  by mouth two (2) days a week.  b complex-c-e-zn (STRESSTABS W ZINC) tablet Take 1 Tab by mouth as needed.  diclofenac potassium (CATAFLAM) 50 mg tablet Take 50 mg by mouth three (3) times daily.  SITagliptin-metFORMIN (JANUMET)  mg per tablet Take 1 Tab by mouth two (2) times daily (with meals). 180 Tab 1    fluticasone (FLONASE) 50 mcg/actuation nasal spray 2 Sprays by Both Nostrils route daily. 1 Bottle 0    naproxen sodium (ALEVE) 220 mg tablet Take 220 mg by mouth two (2) times daily (with meals). ROS:  12 point review of systems was performed.  All negative except for HPI     Physical Exam:  Visit Vitals  BP (!) 142/90 (BP 1 Location: Left upper arm, BP Patient Position: Sitting)   Pulse 97   Ht 5' 3\" (1.6 m) Wt 236 lb (107 kg)   SpO2 99%   BMI 41.81 kg/m²       Gen:  Well-developed, well-nourished, in no acute distress  HEENT:  Pink conjunctivae, PERRL, hearing intact to voice, moist mucous membranes  Neck:  Supple, without masses, thyroid non-tender  Resp:  No accessory muscle use, clear breath sounds without wheezes rales or rhonchi  Card:  No murmurs, normal S1, S2 without thrills, bruits or peripheral edema  Abd:  Soft, non-tender, non-distended, normoactive bowel sounds are present, no palpable organomegaly and no detectable hernias  Lymph:  No cervical or inguinal adenopathy  Musc:  No cyanosis or clubbing  Skin:  No rashes or ulcers, skin turgor is good  Neuro:  Cranial nerves are grossly intact, no focal motor weakness, follows commands appropriately  Psych:  Good insight, oriented to person, place and time, alert     Labs:     Lab Results   Component Value Date/Time    WBC 6.9 09/23/2019 09:26 PM    HGB 12.3 09/23/2019 09:26 PM    HCT 38.0 09/23/2019 09:26 PM    PLATELET 016 26/38/1316 09:26 PM    MCV 88.8 09/23/2019 09:26 PM     Lab Results   Component Value Date/Time    Hemoglobin A1c 8.2 (H) 12/17/2020 03:48 PM    Hemoglobin A1c 10.2 (H) 06/11/2020 03:28 PM    Hemoglobin A1c 8.4 (H) 02/20/2020 11:34 AM    Glucose 150 (H) 12/17/2020 03:48 PM    Glucose (POC) 225 (H) 06/20/2014 03:59 PM    Microalb/Creat ratio (ug/mg creat.) 7 02/20/2020 11:34 AM    LDL, calculated 102 (H) 02/20/2020 11:34 AM    Creatinine 0.93 12/17/2020 03:48 PM      Lab Results   Component Value Date/Time    Cholesterol, total 175 02/20/2020 11:34 AM    HDL Cholesterol 35 (L) 02/20/2020 11:34 AM    LDL, calculated 102 (H) 02/20/2020 11:34 AM    Triglyceride 189 (H) 02/20/2020 11:34 AM     Lab Results   Component Value Date/Time    ALT (SGPT) 38 (H) 06/11/2020 03:28 PM    Alk.  phosphatase 70 06/11/2020 03:28 PM    Bilirubin, direct 0.18 06/23/2014 04:57 PM    Bilirubin, total 0.4 06/11/2020 03:28 PM    Albumin 4.4 06/11/2020 03:28 PM Protein, total 6.9 06/11/2020 03:28 PM    Ammonia 23 09/23/2019 10:58 PM    PLATELET 209 60/21/5554 09:26 PM     No results found for: INR, INREXT, PTMR, PTP, PT1, PT2, INREXT   Lab Results   Component Value Date/Time    GFR est non-AA >60 12/17/2020 03:48 PM    GFR est AA >60 12/17/2020 03:48 PM    Creatinine 0.93 12/17/2020 03:48 PM    BUN 15 12/17/2020 03:48 PM    Sodium 140 12/17/2020 03:48 PM    Potassium 4.0 12/17/2020 03:48 PM    Chloride 105 12/17/2020 03:48 PM    CO2 26 12/17/2020 03:48 PM    Magnesium 1.7 12/21/2016 11:11 AM    Phosphorus 4.1 12/21/2016 11:11 AM     No results found for: PSA, Roberto Webb, [id number], [id number]  Lab Results   Component Value Date/Time    TSH 1.89 09/23/2019 09:26 PM      Lab Results   Component Value Date/Time    Glucose 150 (H) 12/17/2020 03:48 PM    Glucose (POC) 225 (H) 06/20/2014 03:59 PM      Lab Results   Component Value Date/Time    Troponin-I, Qt. <0.05 09/23/2019 09:26 PM      No results found for: BNP, BNPP, BNPPPOC, XBNPT, BNPNT   Lab Results   Component Value Date/Time    Sodium 140 12/17/2020 03:48 PM    Potassium 4.0 12/17/2020 03:48 PM    Chloride 105 12/17/2020 03:48 PM    CO2 26 12/17/2020 03:48 PM    Anion gap 9 12/17/2020 03:48 PM    Glucose 150 (H) 12/17/2020 03:48 PM    BUN 15 12/17/2020 03:48 PM    Creatinine 0.93 12/17/2020 03:48 PM    BUN/Creatinine ratio 16 12/17/2020 03:48 PM    GFR est AA >60 12/17/2020 03:48 PM    GFR est non-AA >60 12/17/2020 03:48 PM    Calcium 9.5 12/17/2020 03:48 PM      Lab Results   Component Value Date/Time    Sodium 140 12/17/2020 03:48 PM    Potassium 4.0 12/17/2020 03:48 PM    Chloride 105 12/17/2020 03:48 PM    CO2 26 12/17/2020 03:48 PM    Anion gap 9 12/17/2020 03:48 PM    Glucose 150 (H) 12/17/2020 03:48 PM    BUN 15 12/17/2020 03:48 PM    Creatinine 0.93 12/17/2020 03:48 PM    BUN/Creatinine ratio 16 12/17/2020 03:48 PM    GFR est AA >60 12/17/2020 03:48 PM    GFR est non-AA >60 12/17/2020 03:48 PM    Calcium 9.5 12/17/2020 03:48 PM    Bilirubin, total 0.4 06/11/2020 03:28 PM    ALT (SGPT) 38 (H) 06/11/2020 03:28 PM    Alk. phosphatase 70 06/11/2020 03:28 PM    Protein, total 6.9 06/11/2020 03:28 PM    Albumin 4.4 06/11/2020 03:28 PM    Globulin 3.9 09/23/2019 09:26 PM    A-G Ratio 1.8 06/11/2020 03:28 PM      Lab Results   Component Value Date/Time    Hemoglobin A1c 8.2 (H) 12/17/2020 03:48 PM    Hemoglobin A1c (POC) 7.5 03/24/2016 12:14 PM         No results for input(s): CPK, CKMB, TROIQ in the last 72 hours.     No lab exists for component: CKQMB, CPKMB        Problem List:     Problem List  Date Reviewed: 12/31/2020          Codes Class Noted    Severe obesity ([facility] ICD-10-CM: E66.01  ICD-9-CM: 278.01  2/20/2020        Convulsive syncope ICD-10-CM: R55  ICD-9-CM: 780.2, 780.39  12/5/2019        Altered mental status, unspecified ICD-10-CM: R41.82  ICD-9-CM: 780.97  12/5/2019        Cellulitis of face ICD-10-CM: L03.211  ICD-9-CM: 682.0  6/1/2016    Overview Signed 7/16/2016  2:18 PM by Alcide Libman, LPN [facility]-              Diabetes mellitus type II, uncontrolled ([facility] ICD-10-CM: E11.65  ICD-9-CM: 250.02  7/12/2014        Radiculitis ICD-10-CM: M54.10  ICD-9-CM: 729.2  Unknown        Lumbar spondylosis ICD-10-CM: M47.816  ICD-9-CM: 721.3  Unknown        Trigger thumb of left hand ICD-10-CM: M65.312  ICD-9-CM: 727.03  6/17/2013    Overview Signed 6/17/2013 11:38 AM by Henry Perales MD     Injection left thumb mid March 2013, Dr Carmell Nares             ARIZA (nonalcoholic steatohepatitis) ICD-10-CM: K75.81  ICD-9-CM: 571.8  3/21/2013        Elevated blood sugar ICD-10-CM: R73.9  ICD-9-CM: 790.29  3/21/2013        Vitamin D deficiency ICD-10-CM: E55.9  ICD-9-CM: 268.9  3/21/2013        Asthma ICD-10-CM: J45.909  ICD-9-CM: 493.90  3/19/2010        HBP (high blood pressure) ICD-10-CM: I10  ICD-9-CM: 401.9  3/19/2010        Anxiety ICD-10-CM: F41.9  ICD-9-CM: 300.00  3/19/2010    Overview Signed 3/19/2010  1:46 PM by Abimael Bella     Panic attack             Urinary incontinence ICD-10-CM: R32  ICD-9-CM: 788.30  3/19/2010        CTS (carpal tunnel syndrome) ICD-10-CM: G56.00  ICD-9-CM: 354.0  3/19/2010    Overview Signed 3/19/2010  1:47 PM by Mariam Kam     Left             AR (allergic rhinitis) ICD-10-CM: J30.9  ICD-9-CM: 477.9  3/19/2010        Peripheral edema ICD-10-CM: R60.9  ICD-9-CM: 782.3  3/19/2010        Lumbar disc disease ICD-10-CM: M51.9  ICD-9-CM: 722.93  3/19/2010    Overview Signed 3/19/2010  1:50 PM by Mariam Kam     L4-L5                     Nathan Maxwell MD, [facility]

## 2021-03-13 DIAGNOSIS — M79.7 FIBROMYALGIA: ICD-10-CM

## 2021-03-15 RX ORDER — CYCLOBENZAPRINE HCL 10 MG
TABLET ORAL
Qty: 30 TAB | Refills: 0 | Status: SHIPPED | OUTPATIENT
Start: 2021-03-15 | End: 2021-04-19

## 2021-03-15 RX ORDER — DULOXETIN HYDROCHLORIDE 60 MG/1
CAPSULE, DELAYED RELEASE ORAL
Qty: 90 CAP | Refills: 0 | Status: SHIPPED | OUTPATIENT
Start: 2021-03-15 | End: 2021-06-07

## 2021-03-27 ENCOUNTER — APPOINTMENT (OUTPATIENT)
Dept: GENERAL RADIOLOGY | Age: 61
End: 2021-03-27
Attending: EMERGENCY MEDICINE
Payer: COMMERCIAL

## 2021-03-27 ENCOUNTER — HOSPITAL ENCOUNTER (EMERGENCY)
Age: 61
Discharge: HOME | End: 2021-03-28
Attending: EMERGENCY MEDICINE
Payer: COMMERCIAL

## 2021-03-27 DIAGNOSIS — R60.9 EDEMA, UNSPECIFIED TYPE: Primary | ICD-10-CM

## 2021-03-27 LAB
ALBUMIN SERPL-MCNC: 3.5 G/DL (ref 3.5–5)
ALBUMIN/GLOB SERPL: 1 {RATIO} (ref 1.1–2.2)
ALP SERPL-CCNC: 86 U/L (ref 45–117)
ALT SERPL-CCNC: 36 U/L (ref 12–78)
ANION GAP SERPL CALC-SCNC: 4 MMOL/L (ref 5–15)
AST SERPL-CCNC: 19 U/L (ref 15–37)
BASOPHILS # BLD: 0 K/UL (ref 0–0.1)
BASOPHILS NFR BLD: 0 % (ref 0–1)
BILIRUB SERPL-MCNC: 0.2 MG/DL (ref 0.2–1)
BNP SERPL-MCNC: 108 PG/ML
BUN SERPL-MCNC: 21 MG/DL (ref 6–20)
BUN/CREAT SERPL: 33 (ref 12–20)
CALCIUM SERPL-MCNC: 8.5 MG/DL (ref 8.5–10.1)
CHLORIDE SERPL-SCNC: 110 MMOL/L (ref 97–108)
CO2 SERPL-SCNC: 26 MMOL/L (ref 21–32)
COMMENT, HOLDF: NORMAL
CREAT SERPL-MCNC: 0.63 MG/DL (ref 0.55–1.02)
DIFFERENTIAL METHOD BLD: NORMAL
EOSINOPHIL # BLD: 0.1 K/UL (ref 0–0.4)
EOSINOPHIL NFR BLD: 2 % (ref 0–7)
ERYTHROCYTE [DISTWIDTH] IN BLOOD BY AUTOMATED COUNT: 14.3 % (ref 11.5–14.5)
GLOBULIN SER CALC-MCNC: 3.5 G/DL (ref 2–4)
GLUCOSE SERPL-MCNC: 163 MG/DL (ref 65–100)
HCT VFR BLD AUTO: 35.9 % (ref 35–47)
HGB BLD-MCNC: 11.6 G/DL (ref 11.5–16)
IMM GRANULOCYTES # BLD AUTO: 0 K/UL (ref 0–0.04)
IMM GRANULOCYTES NFR BLD AUTO: 0 % (ref 0–0.5)
LYMPHOCYTES # BLD: 3.1 K/UL (ref 0.8–3.5)
LYMPHOCYTES NFR BLD: 40 % (ref 12–49)
MAGNESIUM SERPL-MCNC: 1.8 MG/DL (ref 1.6–2.4)
MCH RBC QN AUTO: 29.6 PG (ref 26–34)
MCHC RBC AUTO-ENTMCNC: 32.3 G/DL (ref 30–36.5)
MCV RBC AUTO: 91.6 FL (ref 80–99)
MONOCYTES # BLD: 0.7 K/UL (ref 0–1)
MONOCYTES NFR BLD: 9 % (ref 5–13)
NEUTS SEG # BLD: 3.7 K/UL (ref 1.8–8)
NEUTS SEG NFR BLD: 49 % (ref 32–75)
NRBC # BLD: 0 K/UL (ref 0–0.01)
NRBC BLD-RTO: 0 PER 100 WBC
PLATELET # BLD AUTO: 219 K/UL (ref 150–400)
PMV BLD AUTO: 11.2 FL (ref 8.9–12.9)
POTASSIUM SERPL-SCNC: 3.9 MMOL/L (ref 3.5–5.1)
PROT SERPL-MCNC: 7 G/DL (ref 6.4–8.2)
RBC # BLD AUTO: 3.92 M/UL (ref 3.8–5.2)
SAMPLES BEING HELD,HOLD: NORMAL
SARS-COV-2, NAA: NEGATIVE
SODIUM SERPL-SCNC: 140 MMOL/L (ref 136–145)
TROPONIN I SERPL-MCNC: <0.05 NG/ML
WBC # BLD AUTO: 7.7 K/UL (ref 3.6–11)

## 2021-03-27 PROCEDURE — 83735 ASSAY OF MAGNESIUM: CPT

## 2021-03-27 PROCEDURE — 36415 COLL VENOUS BLD VENIPUNCTURE: CPT

## 2021-03-27 PROCEDURE — 93005 ELECTROCARDIOGRAM TRACING: CPT

## 2021-03-27 PROCEDURE — 74011250636 HC RX REV CODE- 250/636: Performed by: EMERGENCY MEDICINE

## 2021-03-27 PROCEDURE — 99284 EMERGENCY DEPT VISIT MOD MDM: CPT

## 2021-03-27 PROCEDURE — 84484 ASSAY OF TROPONIN QUANT: CPT

## 2021-03-27 PROCEDURE — 71045 X-RAY EXAM CHEST 1 VIEW: CPT

## 2021-03-27 PROCEDURE — 83880 ASSAY OF NATRIURETIC PEPTIDE: CPT

## 2021-03-27 PROCEDURE — 80053 COMPREHEN METABOLIC PANEL: CPT

## 2021-03-27 PROCEDURE — 85025 COMPLETE CBC W/AUTO DIFF WBC: CPT

## 2021-03-27 PROCEDURE — 96374 THER/PROPH/DIAG INJ IV PUSH: CPT

## 2021-03-27 RX ORDER — FUROSEMIDE 10 MG/ML
80 INJECTION INTRAMUSCULAR; INTRAVENOUS
Status: COMPLETED | OUTPATIENT
Start: 2021-03-27 | End: 2021-03-27

## 2021-03-27 RX ADMIN — FUROSEMIDE 80 MG: 10 INJECTION, SOLUTION INTRAMUSCULAR; INTRAVENOUS at 23:42

## 2021-03-27 NOTE — Clinical Note
[address] 
[facility] EMERGENCY DEPT 
[address] [facility] [place] [address] [phone] Work/School Note Date: 3/27/2021 To Whom It May concern: 
 
Otf Khoury was seen and treated today in the emergency room by the following provider(s): 
Attending Provider: Myah Romo MD.   
 
Otf Khoury is excused from work/school on 3/28/2021 through 3/31/2021. She is medically clear to return to work/school on 4/1/2021. Sincerely, Liberty Farley MD

## 2021-03-28 VITALS
TEMPERATURE: 97.1 F | DIASTOLIC BLOOD PRESSURE: 75 MMHG | SYSTOLIC BLOOD PRESSURE: 116 MMHG | HEIGHT: 63 IN | HEART RATE: 85 BPM | BODY MASS INDEX: 43.12 KG/M2 | RESPIRATION RATE: 18 BRPM | WEIGHT: 243.39 LBS | OXYGEN SATURATION: 93 %

## 2021-03-28 LAB
APPEARANCE UR: CLEAR
ATRIAL RATE: 68 BPM
BACTERIA URNS QL MICRO: NEGATIVE /HPF
BILIRUB UR QL: NEGATIVE
CALCULATED P AXIS, ECG09: 37 DEGREES
CALCULATED R AXIS, ECG10: 4 DEGREES
CALCULATED T AXIS, ECG11: 12 DEGREES
COLOR UR: ABNORMAL
DIAGNOSIS, 93000: NORMAL
EPITH CASTS URNS QL MICRO: ABNORMAL /LPF
GLUCOSE UR STRIP.AUTO-MCNC: NEGATIVE MG/DL
HGB UR QL STRIP: NEGATIVE
HYALINE CASTS URNS QL MICRO: ABNORMAL /LPF (ref 0–5)
KETONES UR QL STRIP.AUTO: NEGATIVE MG/DL
LEUKOCYTE ESTERASE UR QL STRIP.AUTO: ABNORMAL
NITRITE UR QL STRIP.AUTO: NEGATIVE
P-R INTERVAL, ECG05: 148 MS
PH UR STRIP: 6 [PH] (ref 5–8)
PROT UR STRIP-MCNC: NEGATIVE MG/DL
Q-T INTERVAL, ECG07: 416 MS
QRS DURATION, ECG06: 74 MS
QTC CALCULATION (BEZET), ECG08: 442 MS
RBC #/AREA URNS HPF: ABNORMAL /HPF (ref 0–5)
SP GR UR REFRACTOMETRY: 1.01 (ref 1–1.03)
UA: UC IF INDICATED,UAUC: ABNORMAL
UROBILINOGEN UR QL STRIP.AUTO: 0.2 EU/DL (ref 0.2–1)
VENTRICULAR RATE, ECG03: 68 BPM
WBC URNS QL MICRO: ABNORMAL /HPF (ref 0–4)

## 2021-03-28 PROCEDURE — 87086 URINE CULTURE/COLONY COUNT: CPT

## 2021-03-28 PROCEDURE — 81001 URINALYSIS AUTO W/SCOPE: CPT

## 2021-03-28 RX ORDER — FUROSEMIDE 20 MG/1
20 TABLET ORAL DAILY
Qty: 5 TAB | Refills: 0 | Status: SHIPPED | OUTPATIENT
Start: 2021-03-28 | End: 2021-08-16

## 2021-03-28 NOTE — ED NOTES
Bedside commode placed next to stretcher. Pt instructed to call out when urine sample can be provided.

## 2021-03-28 NOTE — ED TRIAGE NOTES
Pt ambulatory to triage with mask, multiple complaints. Sent by [facility] for increased weight gain, reports 15lb weight gain in 1 week, 6lb weight gain since last visit approx 2 weeks ago. .  Reports cards appt on 3/30 for stress test and echo r/t episodes of tachycardia approx 2 months ago. Reports increased SOB x approx 5 days. Also reports increased fatigue. States tonight felt \"fullness\" in abd causing concerns, dx with [facility]TI at [facility].  Also reports concerns for incontinence but states today she has been voiding very little without the sensation of needing to void.

## 2021-03-28 NOTE — ED PROVIDER NOTES
The patient is a 80-year-old female with a past medical history significant for anxiety, allergic rhinitis, asthma, carpal tunnel syndrome, degenerative joint disease, fatty liver, fibromyalgia, hypertension, hiatal hernia, lumbar spondylosis, peripheral edema, radiculitis, urinary incontinence who presents to the ED with a complaint of 1 week weight gain and edema. The patient said that she gained 15 in the past week with shortness of breath exertion and at rest.  The patient denies any headache, blurred vision, fever and chills, sore throat, cough, congestion, neck and back pain, nausea, vomiting, abdominal pain, diarrhea, constipation, dysuria, extremity weakness or numbness. The patient denies having any increased salt or water intake.            Past Medical History:   Diagnosis Date    Anxiety      AR (allergic rhinitis)      dr Oumar caicedo allergy & asthma    Asthma      Cellulitis of face 6/2016    [facility]- right side of face    CTS (carpal tunnel syndrome)      DJD (degenerative joint disease) of lumbar spine     Fatty liver     Fibromyalgia     HBP (high blood pressure)      Hiatal hernia     Lumbar disc disease      Lumbar spondylosis 12/10/13    Peripheral edema      Radiculitis 12/10/13    lumbar    Trigger thumb of left hand 6/17/2013    Injection left thumb mid March 2013, Dr Linda Aquino incontinence       Dr. Jaky Eldridge       Past Surgical History:   Procedure Laterality Date    COLONOSCOPY      nov 2010; normal    HX GI      cholestcystec    HX GYN      bladder neck    HX HERNIA REPAIR      hiatal hernia    HX ORTHOPAEDIC      carpal and tarsal tunnel released    HX ORTHOPAEDIC  4/29/2013    2nd and 4th finger, right hand, trigger release         Family History:   Problem Relation Age of Onset    Alcohol abuse Father     Cancer Father         prostate    Hypertension Father     Diabetes Father     Diabetes Mother     Hypertension Mother     Arthritis-osteo Mother     Alcohol abuse Brother     Cancer Maternal Uncle         colon    Stroke Paternal Uncle     Cancer Maternal Grandfather         lung ca    Breast Cancer Maternal Grandmother     Breast Cancer Paternal Grandmother        Social History     Socioeconomic History    Marital status:      Spouse name: Not on file    Number of children: Not on file    Years of education: Not on file    Highest education level: Not on file   Occupational History    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[employer]     Employer: department of  [employer]   Social Needs    Financial resource strain: Not on file    Food insecurity     Worry: Not on file     Inability: Not on file   [employer] needs     Medical: Not on file     Non-medical: Not on file   Tobacco Use    Smoking status: Never Smoker    Smokeless tobacco: Never Used   Substance and Sexual Activity    Alcohol use: No    Drug use: No    Sexual activity: Not Currently   Lifestyle    Physical activity     Days per week: Not on file     Minutes per session: Not on file    Stress: Not on file   Relationships    Social connections     Talks on phone: Not on file     Gets together: Not on file     Attends [employer] service: Not on file     Active member of club or organization: Not on file     Attends meetings of clubs or organizations: Not on file     Relationship status: Not on file    Intimate partner violence     Fear of current or ex partner: Not on file     Emotionally abused: Not on file     Physically abused: Not on file     Forced sexual activity: Not on file   Other Topics Concern    Not on file   Social History Narrative    Not on file         ALLERGIES: Ace inhibitors, Atorvastatin, Marcaine [bupivacaine], and Sulfa (sulfonamide antibiotics)    Review of Systems   All other systems reviewed and are negative.       Vitals:    03/27/21 2245   BP: (!) 147/80   Pulse: 85   Resp: 18   Temp: 97.1 °F (36.2 °C)   SpO2: 95% Weight: 110.4 kg (243 lb 6.2 oz)   Height: 5' 3\" (1.6 m)            Physical Exam  Vitals signs and nursing note reviewed. CONSTITUTIONAL: Well-appearing; well-nourished; in no apparent distress  HEAD: Normocephalic; atraumatic  EYES: PERRL; EOM intact; conjunctiva and sclera are clear bilaterally. ENT: No rhinorrhea; normal pharynx with no tonsillar hypertrophy; mucous membranes pink/moist, no erythema, no exudate. NECK: Supple; non-tender; no cervical lymphadenopathy  CARD: Normal S1, S2; no murmurs, rubs, or gallops. Regular rate and rhythm. RESP: Normal respiratory effort; breath sounds clear and equal bilaterally; no wheezes, rhonchi, or rales. ABD: Normal bowel sounds; non-distended; non-tender; no palpable organomegaly, no masses, no bruits. Back Exam: Normal inspection; no vertebral point tenderness, no CVA tenderness. Normal range of motion. EXT: Normal ROM in all four extremities; non-tender to palpation; no swelling or deformity; distal pulses are normal, no edema. SKIN: Warm; dry; no rash. NEURO:Alert and oriented x 3, coherent, DILIP-XII grossly intact, sensory and motor are non-focal.        MDM  Number of Diagnoses or Management Options  Edema, unspecified type  Diagnosis management comments: Assessment: This is a 80-year-old female who appears hemodynamically stable with weight gain and edema. She has a nonfocal exam, very anxious and restless. The patient evaluation for ACS, electrolyte abnormality, CHF, pulmonary edema. Plan: Lab/EKG chest x-ray/ /Lasix/education, reassurance, symptomatic treatment/serial exam/ Monitor and Reevaluate.          Amount and/or Complexity of Data Reviewed  Clinical lab tests: ordered and reviewed  Tests in the radiology section of CPT®: ordered and reviewed  Tests in the medicine section of CPT®: reviewed and ordered  Discussion of test results with the performing providers: yes  Decide to obtain previous medical records or to obtain history from someone other than the patient: yes  Obtain history from someone other than the patient: yes  Review and summarize past medical records: yes  Discuss the patient with other providers: yes  Independent visualization of images, tracings, or specimens: yes    Risk of Complications, Morbidity, and/or Mortality  Presenting problems: moderate  Diagnostic procedures: moderate  Management options: moderate    Patient Progress  Patient progress: stable         Procedures    ED EKG interpretation:  Rhythm: normal sinus rhythm; and regular . Rate (approx.): 68; Axis: normal; P wave: normal; QRS interval: normal ; ST/T wave: normal; in  Lead: Diffusely; Other findings: abnormal ekg. This EKG was interpreted by Rosanna Villegas MD,ED Provider. XRAY INTERPRETATION (ED MD)  Chest Xray  No acute process seen. Normal heart size. No bony abnormalities. No infiltrate. Yaneli Decker MD 12:32 AM    Progress Note:   Pt has been reexamined by Rosanna Villegas MD. Pt is feeling much better. Symptoms have improved. All available results have been reviewed with pt and any available family. Pt understands sx, dx, and tx in ED. Care plan has been outlined and questions have been answered. Pt is ready to go home. Will send home on edema instructions. Outpatient referral with PCP as needed. Written by Rosanna Villegas MD,12:33 AM    .   .

## 2021-03-29 LAB
BACTERIA SPEC CULT: ABNORMAL
CC UR VC: ABNORMAL
SERVICE CMNT-IMP: ABNORMAL

## 2021-04-18 DIAGNOSIS — M79.7 FIBROMYALGIA: ICD-10-CM

## 2021-04-18 DIAGNOSIS — L08.9 SKIN INFECTION: ICD-10-CM

## 2021-04-19 ENCOUNTER — DOCUMENTATION ONLY (OUTPATIENT)
Dept: CARDIOLOGY CLINIC | Age: 61
End: 2021-04-19

## 2021-04-19 RX ORDER — MUPIROCIN 20 MG/G
OINTMENT TOPICAL
Qty: 22 G | Refills: 0 | Status: SHIPPED | OUTPATIENT
Start: 2021-04-19 | End: 2021-08-16

## 2021-04-19 RX ORDER — CYCLOBENZAPRINE HCL 10 MG
TABLET ORAL
Qty: 30 TAB | Refills: 0 | Status: SHIPPED | OUTPATIENT
Start: 2021-04-19 | End: 2021-08-16

## 2021-04-19 RX ORDER — ALBUTEROL SULFATE 90 UG/1
1 AEROSOL, METERED RESPIRATORY (INHALATION)
Qty: 6.7 INHALER | Refills: 4 | Status: SHIPPED | OUTPATIENT
Start: 2021-04-19

## 2021-04-25 DIAGNOSIS — E11.65 UNCONTROLLED TYPE 2 DIABETES MELLITUS WITH HYPERGLYCEMIA (HCC): Primary | ICD-10-CM

## 2021-04-25 RX ORDER — INSULIN GLARGINE 100 [IU]/ML
50 INJECTION, SOLUTION SUBCUTANEOUS
Qty: 15 PEN | Refills: 0 | Status: SHIPPED | OUTPATIENT
Start: 2021-04-25 | End: 2021-07-24

## 2021-05-17 ENCOUNTER — APPOINTMENT (OUTPATIENT)
Dept: GENERAL RADIOLOGY | Age: 61
End: 2021-05-17
Attending: NURSE PRACTITIONER
Payer: COMMERCIAL

## 2021-05-17 ENCOUNTER — HOSPITAL ENCOUNTER (EMERGENCY)
Age: 61
Discharge: HOME | End: 2021-05-17
Attending: STUDENT IN AN ORGANIZED HEALTH CARE EDUCATION/TRAINING PROGRAM
Payer: COMMERCIAL

## 2021-05-17 VITALS
TEMPERATURE: 96.9 F | BODY MASS INDEX: 43.12 KG/M2 | WEIGHT: 243.39 LBS | HEIGHT: 63 IN | DIASTOLIC BLOOD PRESSURE: 94 MMHG | HEART RATE: 105 BPM | RESPIRATION RATE: 18 BRPM | SYSTOLIC BLOOD PRESSURE: 161 MMHG | OXYGEN SATURATION: 99 %

## 2021-05-17 DIAGNOSIS — N39.0 URINARY TRACT INFECTION WITHOUT HEMATURIA, SITE UNSPECIFIED: Primary | ICD-10-CM

## 2021-05-17 LAB
ALBUMIN SERPL-MCNC: 3.8 G/DL (ref 3.5–5)
ALBUMIN/GLOB SERPL: 1 {RATIO} (ref 1.1–2.2)
ALP SERPL-CCNC: 110 U/L (ref 45–117)
ALT SERPL-CCNC: 30 U/L (ref 12–78)
ANION GAP SERPL CALC-SCNC: 7 MMOL/L (ref 5–15)
APPEARANCE UR: CLEAR
AST SERPL-CCNC: 22 U/L (ref 15–37)
BACTERIA URNS QL MICRO: ABNORMAL /HPF
BASOPHILS # BLD: 0 K/UL (ref 0–0.1)
BASOPHILS NFR BLD: 1 % (ref 0–1)
BILIRUB SERPL-MCNC: 0.3 MG/DL (ref 0.2–1)
BILIRUB UR QL: NEGATIVE
BUN SERPL-MCNC: 13 MG/DL (ref 6–20)
BUN/CREAT SERPL: 16 (ref 12–20)
CALCIUM SERPL-MCNC: 9.2 MG/DL (ref 8.5–10.1)
CHLORIDE SERPL-SCNC: 104 MMOL/L (ref 97–108)
CO2 SERPL-SCNC: 26 MMOL/L (ref 21–32)
COLOR UR: ABNORMAL
CREAT SERPL-MCNC: 0.81 MG/DL (ref 0.55–1.02)
DIFFERENTIAL METHOD BLD: NORMAL
EOSINOPHIL # BLD: 0.2 K/UL (ref 0–0.4)
EOSINOPHIL NFR BLD: 2 % (ref 0–7)
EPITH CASTS URNS QL MICRO: ABNORMAL /LPF
ERYTHROCYTE [DISTWIDTH] IN BLOOD BY AUTOMATED COUNT: 13.2 % (ref 11.5–14.5)
GLOBULIN SER CALC-MCNC: 3.8 G/DL (ref 2–4)
GLUCOSE BLD STRIP.AUTO-MCNC: 321 MG/DL (ref 65–117)
GLUCOSE SERPL-MCNC: 298 MG/DL (ref 65–100)
GLUCOSE UR STRIP.AUTO-MCNC: >1000 MG/DL
HCT VFR BLD AUTO: 42.7 % (ref 35–47)
HGB BLD-MCNC: 13.8 G/DL (ref 11.5–16)
HGB UR QL STRIP: NEGATIVE
IMM GRANULOCYTES # BLD AUTO: 0 K/UL (ref 0–0.04)
IMM GRANULOCYTES NFR BLD AUTO: 0 % (ref 0–0.5)
KETONES UR QL STRIP.AUTO: 15 MG/DL
LEUKOCYTE ESTERASE UR QL STRIP.AUTO: NEGATIVE
LYMPHOCYTES # BLD: 3 K/UL (ref 0.8–3.5)
LYMPHOCYTES NFR BLD: 43 % (ref 12–49)
MCH RBC QN AUTO: 28.9 PG (ref 26–34)
MCHC RBC AUTO-ENTMCNC: 32.3 G/DL (ref 30–36.5)
MCV RBC AUTO: 89.5 FL (ref 80–99)
MONOCYTES # BLD: 0.6 K/UL (ref 0–1)
MONOCYTES NFR BLD: 8 % (ref 5–13)
NEUTS SEG # BLD: 3.2 K/UL (ref 1.8–8)
NEUTS SEG NFR BLD: 46 % (ref 32–75)
NITRITE UR QL STRIP.AUTO: NEGATIVE
NRBC # BLD: 0 K/UL (ref 0–0.01)
NRBC BLD-RTO: 0 PER 100 WBC
PH UR STRIP: 6 [PH] (ref 5–8)
PLATELET # BLD AUTO: 227 K/UL (ref 150–400)
PMV BLD AUTO: 12.3 FL (ref 8.9–12.9)
POTASSIUM SERPL-SCNC: 3.9 MMOL/L (ref 3.5–5.1)
PROT SERPL-MCNC: 7.6 G/DL (ref 6.4–8.2)
PROT UR STRIP-MCNC: NEGATIVE MG/DL
RBC # BLD AUTO: 4.77 M/UL (ref 3.8–5.2)
RBC #/AREA URNS HPF: ABNORMAL /HPF (ref 0–5)
SERVICE CMNT-IMP: ABNORMAL
SODIUM SERPL-SCNC: 137 MMOL/L (ref 136–145)
SP GR UR REFRACTOMETRY: 1.02 (ref 1–1.03)
TROPONIN I SERPL-MCNC: <0.05 NG/ML
UR CULT HOLD, URHOLD: NORMAL
UROBILINOGEN UR QL STRIP.AUTO: 0.2 EU/DL (ref 0.2–1)
WBC # BLD AUTO: 6.9 K/UL (ref 3.6–11)
WBC URNS QL MICRO: ABNORMAL /HPF (ref 0–4)

## 2021-05-17 PROCEDURE — 93005 ELECTROCARDIOGRAM TRACING: CPT

## 2021-05-17 PROCEDURE — 96360 HYDRATION IV INFUSION INIT: CPT

## 2021-05-17 PROCEDURE — 82962 GLUCOSE BLOOD TEST: CPT

## 2021-05-17 PROCEDURE — 36415 COLL VENOUS BLD VENIPUNCTURE: CPT

## 2021-05-17 PROCEDURE — 99284 EMERGENCY DEPT VISIT MOD MDM: CPT

## 2021-05-17 PROCEDURE — 84484 ASSAY OF TROPONIN QUANT: CPT

## 2021-05-17 PROCEDURE — 71046 X-RAY EXAM CHEST 2 VIEWS: CPT

## 2021-05-17 PROCEDURE — 81001 URINALYSIS AUTO W/SCOPE: CPT

## 2021-05-17 PROCEDURE — 85025 COMPLETE CBC W/AUTO DIFF WBC: CPT

## 2021-05-17 PROCEDURE — 87086 URINE CULTURE/COLONY COUNT: CPT

## 2021-05-17 PROCEDURE — 74011250636 HC RX REV CODE- 250/636: Performed by: NURSE PRACTITIONER

## 2021-05-17 PROCEDURE — 80053 COMPREHEN METABOLIC PANEL: CPT

## 2021-05-17 PROCEDURE — 74011250637 HC RX REV CODE- 250/637: Performed by: NURSE PRACTITIONER

## 2021-05-17 RX ORDER — FLUCONAZOLE 100 MG/1
150 TABLET ORAL
Status: COMPLETED | OUTPATIENT
Start: 2021-05-17 | End: 2021-05-17

## 2021-05-17 RX ORDER — CEPHALEXIN 500 MG/1
500 CAPSULE ORAL 2 TIMES DAILY
Qty: 14 CAP | Refills: 0 | Status: SHIPPED | OUTPATIENT
Start: 2021-05-17 | End: 2021-05-24

## 2021-05-17 RX ADMIN — SODIUM CHLORIDE 1000 ML: 9 INJECTION, SOLUTION INTRAVENOUS at 18:48

## 2021-05-17 RX ADMIN — FLUCONAZOLE 150 MG: 100 TABLET ORAL at 22:54

## 2021-05-17 NOTE — ED TRIAGE NOTES
Pt reports she started not feeling well this morning with high blood sugars, lightheadedness, SOB, and rapid heart rate. Hx of DM and takes Lantus which she took this morning. Also reports dx with a yeast infection about a week ago and has been taking medication for it but is still having some dysuria.  in triage.

## 2021-05-18 DIAGNOSIS — Z79.4 CONTROLLED TYPE 2 DIABETES MELLITUS WITH HYPERGLYCEMIA, WITH LONG-TERM CURRENT USE OF INSULIN (HCC): Primary | ICD-10-CM

## 2021-05-18 DIAGNOSIS — E11.65 CONTROLLED TYPE 2 DIABETES MELLITUS WITH HYPERGLYCEMIA, WITH LONG-TERM CURRENT USE OF INSULIN (HCC): Primary | ICD-10-CM

## 2021-05-18 NOTE — DISCHARGE INSTRUCTIONS
Please take Keflex for UTI and follow-up with your primary care doctor and endocrinologist for adjustments in your diabetes medications.

## 2021-05-18 NOTE — ED NOTES
Pt had left prior to obtaining discharge vitals and instructions from RN. Attempted to call pt at phone number, left message for pt to call back. Unsure if pt left with or without her IV in place.

## 2021-05-18 NOTE — ED PROVIDER NOTES
This is a 80-year-old female with past medical history of anxiety, fatty liver disease, fibromyalgia, chronic low back pain, DDD, and urinary incontinence who presents the ER today for evaluation of fatigue, dizziness, and hyperglycemia. Patient states that over the last several days she has been experiencing unusually high blood sugars (ranging in the 300s to 400s) despite taking her medications as prescribed. Since her symptoms have been running high, she is also experiencing increased fatigue, dizziness, intermittent palpitations, and increased urinary frequency. She has also been experiencing vaginal discomfort consistent with yeast infections (which she has had recurrent problems with). She denies any acute visual disturbances, lower extremity swelling, syncope, chest pain/chest pressure, shortness of breath, fever/chills, abdominal pain.            Past Medical History:   Diagnosis Date    Anxiety      AR (allergic rhinitis)      dr Lakesha caicedo allergy & asthma    Asthma      Cellulitis of face 6/2016    [facility]- right side of face    CTS (carpal tunnel syndrome)      DJD (degenerative joint disease) of lumbar spine     Fatty liver     Fibromyalgia     HBP (high blood pressure)      Hiatal hernia     Lumbar disc disease      Lumbar spondylosis 12/10/13    Peripheral edema      Radiculitis 12/10/13    lumbar    Trigger thumb of left hand 6/17/2013    Injection left thumb mid March 2013, Dr Chato Sandoval incontinence       Dr. Nat Alexandra       Past Surgical History:   Procedure Laterality Date    COLONOSCOPY      nov 2010; normal    HX GI      cholestcystec    HX GYN      bladder neck    HX HERNIA REPAIR      hiatal hernia    HX ORTHOPAEDIC      carpal and tarsal tunnel released    HX ORTHOPAEDIC  4/29/2013    2nd and 4th finger, right hand, trigger release         Family History:   Problem Relation Age of Onset    Alcohol abuse Father     Cancer Father         prostate    Hypertension Father     Diabetes Father     Diabetes Mother     Hypertension Mother    [facility] Arthritis-osteo Mother     Alcohol abuse Brother     Cancer Maternal Uncle         colon    Stroke Paternal Uncle     Cancer Maternal Grandfather         lung ca    Breast Cancer Maternal Grandmother     Breast Cancer Paternal Grandmother        Social History     Socioeconomic History    Marital status:      Spouse name: Not on file    Number of children: Not on file    Years of education: Not on file    Highest education level: Not on file   Occupational History    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[employer]     Employer: department of [employer] [address] resource strain: Not on file    Food insecurity     Worry: Not on file     Inability: Not on file   [employer] needs     Medical: Not on file     Non-medical: Not on file   Tobacco Use    Smoking status: Never Smoker    Smokeless tobacco: Never Used   Substance and Sexual Activity    Alcohol use: No    Drug use: No    Sexual activity: Not Currently   Lifestyle    Physical activity     Days per week: Not on file     Minutes per session: Not on file    Stress: Not on file   Relationships    Social connections     Talks on phone: Not on file     Gets together: Not on file     Attends Bahai service: Not on file     Active member of club or organization: Not on file     Attends meetings of clubs or organizations: Not on file     Relationship status: Not on file    Intimate partner violence     Fear of current or ex partner: Not on file     Emotionally abused: Not on file     Physically abused: Not on file     Forced sexual activity: Not on file   Other Topics Concern    Not on file   Social History Narrative    Not on file         ALLERGIES: Ace inhibitors, Atorvastatin, Marcaine [bupivacaine], and Sulfa (sulfonamide antibiotics)    Review of Systems   Constitutional: Positive for fatigue.  Negative for chills and fever. HENT: Negative for sore throat. Eyes: Negative for visual disturbance. Respiratory: Negative for shortness of breath. Cardiovascular: Positive for palpitations. Gastrointestinal: Negative for vomiting. Genitourinary: Positive for vaginal discharge. Negative for dysuria and vaginal pain. Vaginal itching / irritation   Musculoskeletal: Negative for myalgias. Skin: Negative for rash. Neurological: Positive for dizziness. Psychiatric/Behavioral: Negative for dysphoric mood. Vitals:    05/17/21 1648   BP: (!) 161/94   Pulse: (!) 105   Resp: 18   Temp: 96.9 °F (36.1 °C)   SpO2: 99%   Weight: 110.4 kg (243 lb 6.2 oz)   Height: 5' 3\" (1.6 m)            Physical Exam  Vitals signs and nursing note reviewed. Constitutional:       General: She is not in acute distress. Appearance: She is obese. She is not ill-appearing. Comments: Calm, polite, conversational   HENT:      Head: Normocephalic and atraumatic. Right Ear: External ear normal.      Left Ear: External ear normal.      Nose: Nose normal.      Mouth/Throat:      Mouth: Mucous membranes are moist.      Pharynx: Oropharynx is clear. Eyes:      General: No scleral icterus. Extraocular Movements: Extraocular movements intact. Conjunctiva/sclera: Conjunctivae normal.      Pupils: Pupils are equal, round, and reactive to light. Neck:      Musculoskeletal: Normal range of motion and neck supple. No neck rigidity or muscular tenderness. Cardiovascular:      Rate and Rhythm: Regular rhythm. Tachycardia present. Pulses: Normal pulses. Heart sounds: Normal heart sounds. Pulmonary:      Effort: Tachypnea present. Breath sounds: Normal breath sounds and air entry. Abdominal:      General: Abdomen is flat. Bowel sounds are normal. There is no distension. Palpations: Abdomen is soft. Tenderness: There is no abdominal tenderness.  There is no right CVA tenderness, left CVA tenderness or guarding. Musculoskeletal: Normal range of motion. Skin:     General: Skin is warm and dry. Coloration: Skin is not pale. Neurological:      General: No focal deficit present. Mental Status: She is alert and oriented to person, place, and time. Mental status is at baseline. Psychiatric:         Mood and Affect: Mood normal.         Behavior: Behavior normal.         Thought Content: Thought content normal.         Judgment: Judgment normal.          MDM      VITAL SIGNS:  No data found. LABS:  Recent Results (from the past 12 hour(s))   GLUCOSE, POC    Collection Time: 05/17/21  4:47 PM   Result Value Ref Range    Glucose (POC) 321 (H) 65 - 117 mg/dL    Performed by Rima Olivarez    CBC WITH AUTOMATED DIFF    Collection Time: 05/17/21  6:50 PM   Result Value Ref Range    WBC 6.9 3.6 - 11.0 K/uL    RBC 4.77 3.80 - 5.20 M/uL    HGB 13.8 11.5 - 16.0 g/dL    HCT 42.7 35.0 - 47.0 %    MCV 89.5 80.0 - 99.0 FL    MCH 28.9 26.0 - 34.0 PG    MCHC 32.3 30.0 - 36.5 g/dL    RDW 13.2 11.5 - 14.5 %    PLATELET 898 627 - 697 K/uL    MPV 12.3 8.9 - 12.9 FL    NRBC 0.0 0  WBC    ABSOLUTE NRBC 0.00 0.00 - 0.01 K/uL    NEUTROPHILS 46 32 - 75 %    LYMPHOCYTES 43 12 - 49 %    MONOCYTES 8 5 - 13 %    EOSINOPHILS 2 0 - 7 %    BASOPHILS 1 0 - 1 %    IMMATURE GRANULOCYTES 0 0.0 - 0.5 %    ABS. NEUTROPHILS 3.2 1.8 - 8.0 K/UL    ABS. LYMPHOCYTES 3.0 0.8 - 3.5 K/UL    ABS. MONOCYTES 0.6 0.0 - 1.0 K/UL    ABS. EOSINOPHILS 0.2 0.0 - 0.4 K/UL    ABS. BASOPHILS 0.0 0.0 - 0.1 K/UL    ABS. IMM.  GRANS. 0.0 0.00 - 0.04 K/UL    DF AUTOMATED     METABOLIC PANEL, COMPREHENSIVE    Collection Time: 05/17/21  6:50 PM   Result Value Ref Range    Sodium 137 136 - 145 mmol/L    Potassium 3.9 3.5 - 5.1 mmol/L    Chloride 104 97 - 108 mmol/L    CO2 26 21 - 32 mmol/L    Anion gap 7 5 - 15 mmol/L    Glucose 298 (H) 65 - 100 mg/dL    BUN 13 6 - 20 MG/DL    Creatinine 0.81 0.55 - 1.02 MG/DL    BUN/Creatinine ratio 16 12 - 20      GFR est AA >60 >60 ml/min/1.73m2    GFR est non-AA >60 >60 ml/min/1.73m2    Calcium 9.2 8.5 - 10.1 MG/DL    Bilirubin, total 0.3 0.2 - 1.0 MG/DL    ALT (SGPT) 30 12 - 78 U/L    AST (SGOT) 22 15 - 37 U/L    Alk. phosphatase 110 45 - 117 U/L    Protein, total 7.6 6.4 - 8.2 g/dL    Albumin 3.8 3.5 - 5.0 g/dL    Globulin 3.8 2.0 - 4.0 g/dL    A-G Ratio 1.0 (L) 1.1 - 2.2     TROPONIN I    Collection Time: 05/17/21  6:50 PM   Result Value Ref Range    Troponin-I, Qt. <0.05 <0.05 ng/mL   URINALYSIS W/MICROSCOPIC    Collection Time: 05/17/21  9:28 PM   Result Value Ref Range    Color YELLOW/STRAW      Appearance CLEAR CLEAR      Specific gravity 1.025 1.003 - 1.030      pH (UA) 6.0 5.0 - 8.0      Protein Negative NEG mg/dL    Glucose >1,000 (A) NEG mg/dL    Ketone 15 (A) NEG mg/dL    Bilirubin Negative NEG      Blood Negative NEG      Urobilinogen 0.2 0.2 - 1.0 EU/dL    Nitrites Negative NEG      Leukocyte Esterase Negative NEG      WBC 20-50 0 - 4 /hpf    RBC 0-5 0 - 5 /hpf    Epithelial cells MODERATE (A) FEW /lpf    Bacteria 1+ (A) NEG /hpf   URINE CULTURE HOLD SAMPLE    Collection Time: 05/17/21  9:28 PM    Specimen: Serum; Urine   Result Value Ref Range    Urine culture hold        Urine on hold in Microbiology dept for 2 days. If unpreserved urine is submitted, it cannot be used for addtional testing after 24 hours, recollection will be required. IMAGING:  XR CHEST PA LAT   Final Result   No acute disease. No significant interval change. Medications During Visit:  Medications   sodium chloride 0.9 % bolus infusion 1,000 mL (0 mL IntraVENous IV Completed 5/17/21 1948)   fluconazole (DIFLUCAN) tablet 150 mg (150 mg Oral Given 5/17/21 2254)         DECISION MAKING:  Jeana Benjamin is a 61 y.o. female who comes in as above. Work-up negative for acute process with the exception of hyperglycemia withoutacute complication and evidence of cystitis.   Patient's glucometer indicates that she has been running in the 200s to 300s range for the last 7 to 10 days and remains within that range during her visit today. Her dizziness has improved after IVF. She continues to feel fatigued and is requesting insulin to bring her sugar down to normal level. Plan:  One-time dose of Diflucan due to patient's concerns of yeast infection (small amount of discharge and vaginal irritation). Keflex with urine culture for suspected cystitis. Close follow-up with her endocrinologist and PCP for management of hyperglycemia (which could be worsened in the setting of UTI). The clinical decision making for this encounter included ordering and interpreting the above diagnostic tests with comparison to prior studies that are within our EMR. Past medical and surgical histories were reviewed, as were records from recent outpatient and emergency department visits. The above results discussed and reviewed with the patient. Patient verbalized understanding of the care plan, including any changes to current outpatient medication regimen, discussed disease process, symptom control, and follow-up care. Return precautions reviewed. IMPRESSION:  1. Urinary tract infection without hematuria, site unspecified        DISPOSITION:  Discharged      Discharge Medication List as of 5/17/2021 11:32 PM           Follow-up Information     Follow up With Specialties Details Why Contact Info    Christella Hammans, MD Family Medicine, Sports Medicine   [address] [place] [id number] [phone]              The patient is asked to follow-up with their primary care provider in the next several days. They are to call tomorrow for an appointment. The patient is asked to return promptly for any increased concerns or worsening of symptoms. They can return to this emergency department or any other emergency department.

## 2021-05-19 LAB
ATRIAL RATE: 76 BPM
BACTERIA SPEC CULT: NORMAL
CALCULATED P AXIS, ECG09: 36 DEGREES
CALCULATED R AXIS, ECG10: 4 DEGREES
CALCULATED T AXIS, ECG11: 9 DEGREES
CC UR VC: NORMAL
DIAGNOSIS, 93000: NORMAL
P-R INTERVAL, ECG05: 122 MS
Q-T INTERVAL, ECG07: 410 MS
QRS DURATION, ECG06: 74 MS
QTC CALCULATION (BEZET), ECG08: 461 MS
SERVICE CMNT-IMP: NORMAL
VENTRICULAR RATE, ECG03: 76 BPM

## 2021-05-20 LAB
HBA1C MFR BLD HPLC: 11.4 %
MICROALBUMIN UR TEST STR-MCNC: 5 MG/DL

## 2021-06-07 RX ORDER — DULOXETIN HYDROCHLORIDE 60 MG/1
CAPSULE, DELAYED RELEASE ORAL
Qty: 90 CAPSULE | Refills: 0 | Status: SHIPPED | OUTPATIENT
Start: 2021-06-07 | End: 2021-08-31

## 2021-06-07 RX ORDER — AMLODIPINE BESYLATE 10 MG/1
TABLET ORAL
Qty: 90 TABLET | Refills: 0 | Status: SHIPPED | OUTPATIENT
Start: 2021-06-07 | End: 2021-09-01

## 2021-07-07 LAB — CREATININE, EXTERNAL: 0.78

## 2021-08-02 RX ORDER — HYDROXYZINE 50 MG/1
50 TABLET, FILM COATED ORAL
Qty: 30 TABLET | Refills: 2 | Status: SHIPPED | OUTPATIENT
Start: 2021-08-02 | End: 2021-08-12

## 2021-08-04 RX ORDER — MONTELUKAST SODIUM 10 MG/1
TABLET ORAL
Qty: 90 TABLET | Refills: 1 | Status: SHIPPED | OUTPATIENT
Start: 2021-08-04

## 2021-08-12 ENCOUNTER — OFFICE VISIT (OUTPATIENT)
Dept: ENDOCRINOLOGY | Age: 61
End: 2021-08-12
Payer: COMMERCIAL

## 2021-08-12 VITALS
SYSTOLIC BLOOD PRESSURE: 152 MMHG | WEIGHT: 235 LBS | HEART RATE: 70 BPM | BODY MASS INDEX: 41.64 KG/M2 | HEIGHT: 63 IN | TEMPERATURE: 96.6 F | RESPIRATION RATE: 18 BRPM | DIASTOLIC BLOOD PRESSURE: 88 MMHG | OXYGEN SATURATION: 97 %

## 2021-08-12 DIAGNOSIS — Z79.4 ENCOUNTER FOR LONG-TERM (CURRENT) USE OF INSULIN (HCC): ICD-10-CM

## 2021-08-12 DIAGNOSIS — E11.65 UNCONTROLLED TYPE 2 DIABETES MELLITUS WITH HYPERGLYCEMIA (HCC): Primary | ICD-10-CM

## 2021-08-12 DIAGNOSIS — L73.9 FOLLICULITIS: ICD-10-CM

## 2021-08-12 PROCEDURE — 99244 OFF/OP CNSLTJ NEW/EST MOD 40: CPT | Performed by: INTERNAL MEDICINE

## 2021-08-12 RX ORDER — GABAPENTIN 400 MG/1
800 CAPSULE ORAL 3 TIMES DAILY
COMMUNITY

## 2021-08-12 RX ORDER — DULAGLUTIDE 0.75 MG/.5ML
0.75 INJECTION, SOLUTION SUBCUTANEOUS
Qty: 4 PEN | Refills: 6 | Status: SHIPPED | OUTPATIENT
Start: 2021-08-12 | End: 2021-09-23 | Stop reason: DRUGHIGH

## 2021-08-12 RX ORDER — GLIPIZIDE 5 MG/1
5 TABLET, FILM COATED, EXTENDED RELEASE ORAL DAILY
COMMUNITY
Start: 2021-08-10 | End: 2021-08-12 | Stop reason: ALTCHOICE

## 2021-08-12 RX ORDER — INSULIN ASPART 100 [IU]/ML
INJECTION, SOLUTION INTRAVENOUS; SUBCUTANEOUS
Qty: 15 ML | Refills: 6 | Status: SHIPPED | OUTPATIENT
Start: 2021-08-12 | End: 2022-01-07 | Stop reason: SDUPTHER

## 2021-08-12 RX ORDER — PEN NEEDLE, DIABETIC 31 GX3/16"
NEEDLE, DISPOSABLE MISCELLANEOUS
Qty: 200 PEN NEEDLE | Refills: 6 | Status: SHIPPED | OUTPATIENT
Start: 2021-08-12 | End: 2022-01-07 | Stop reason: SDUPTHER

## 2021-08-12 RX ORDER — INSULIN GLARGINE 100 [IU]/ML
70 INJECTION, SOLUTION SUBCUTANEOUS
Qty: 30 ML | Refills: 6 | Status: SHIPPED | OUTPATIENT
Start: 2021-08-12 | End: 2021-09-23

## 2021-08-12 RX ORDER — FAMOTIDINE 20 MG/1
20 TABLET, FILM COATED ORAL AS NEEDED
COMMUNITY

## 2021-08-12 RX ORDER — INSULIN GLARGINE 100 [IU]/ML
60 INJECTION, SOLUTION SUBCUTANEOUS
COMMUNITY
Start: 2018-12-21 | End: 2021-08-12 | Stop reason: SDUPTHER

## 2021-08-12 RX ORDER — ASPIRIN 81 MG/1
81 TABLET ORAL DAILY
COMMUNITY

## 2021-08-12 NOTE — LETTER
8/16/2021    Patient: Brad Vargas   YOB: 1960   Date of Visit: 8/12/2021     Mony Bonner MD  [address] [place] [address] Drive  [id number] [place] [address]  Via Fax: [phone]    Dear Mony Bonner MD,      Thank you for referring Ms. Cindy Oshea to [id number] [place] [address] for evaluation. My notes for this consultation are attached. If you have questions, please do not hesitate to call me. I look forward to following your patient along with you.       Sincerely,    Nori Connelly MD

## 2021-08-12 NOTE — PROGRESS NOTES
Matt Page is a 61 y.o. female here for   Chief Complaint   Patient presents with    New Patient     referred for DM       1. Have you been to the ER, urgent care clinic since your last visit? Hospitalized since your last visit? -n/a    2. Have you seen or consulted any other health care providers outside of the [address] [place] [address] since your last visit? Include any pap smears or colon screening.-n/a    C/o of unexplained rash/ pimples - painful. Seen dermatology. Did take a course on steroids. Complains of perfuse sweating for years.    Has had several steroid injections over the years for disc problem

## 2021-08-12 NOTE — PATIENT INSTRUCTIONS
SPECIFIC INSTRUCTIONS BELOW       DRINK water   Do not skip meals  Do not eat in between meals    Reduce carbs- pasta, rice, potatoes, bread   Try to avoid processed bread products like BISCUITS, CROISSANTS, MUFFINS    Do not drink juices or sodas, even if they are calorie zero or diet drinks and especially avoid using powders like crystalloids , FLORENCE-AIDS     Do not eat peanut butter     Do not eat sugar free cookies and cakes   Do not eat peaches, oranges, pineapples, raisins, grapes , canteloupe , honey dew, mangoes , watermelon  and fruit medleys        --------------------------------------------------------------------------------------------------------------------------------    UNABLE TO START MEDS  BECAUSE OF SKIN RASH       START ON TRULICITY 9.67 MG ONCE  A WEEK       Check blood sugars immediately before each meal       Take  LANTUS  insulin  70 units  at bed time ( STOP GLYBURIDE )    Take NOVOLOG  OR   HUMALOG   Insulin  5 UNITS BEFORE EACH MEAL     Also, add additional NOVOLOG  as follows with meals  If blood sugars are[de-identified]    150-200 mg 1 units    201-250 mg 3 units    251-300 mg 5 units    301-350 mg 7 units    351-400 mg 9 units    401-450 mg 11 units    451-500 mg 13 units     Less than 70 mg NO INSULIN                  -------------PAY ATTENTION TO THESE GENERAL INSTRUCTIONS -----------------    -ANY tests other than blood work, which you opt to do  outside the  [facility] imaging facilities, you are responsible for prior authorizations if  required    - HEALTH MAINTENANCE IS NOT GOING TO BE UP TO DATE ON YOUR AVS- PLEASE IGNORE   - YOUR MED LIST IS NOT UP TO DATE AS SOME CHANGES ARE BEING MADE AFTER THE VISIT - FOLLOW SPECIFIC INSTRUCTIONS  ABOVE     Results     *Normal results will not be notified by a phone call starting January 1 2021   *If you have an upcoming visit, the results will be discussed at the visit   *Please sign up for MY CHART if you want access to your lab and test results  *Abnormal results which require immediate attention will be notified by phone call   *Abnormal results which do not require immediate assistance will be notified in 1-2 weeks       Refills    -    have your pharmacy send us a refill request . Refills are done max for one year and a visit is a must before refills are extended    Follow up appointments -  highly encourage you to make it when you are checking out. We can accommodate you into the schedule based on your clinical situation, but not for extending refills beyond a year. Labs are important to give refills and is important to get labs before the visit     Phone calls  -  Allow  24 hrs.  for non-urgent calls to be returned  Prior authorization - It may take 2-4 weeks to process  Forms  -  FMLA, DMV etc., will take up to 2 weeks to process  Cancellations - please notify the office 2 days in advance   Samples  - will only be dispensed at visits       If not showing for the appointments and cancelling appointments within 24 hours are kept track of and three  of such situations in  two consecutive years will likely be considered for termination from the practice    -------------------------------------------------------------------------------------------------------------------

## 2021-08-31 RX ORDER — DULOXETIN HYDROCHLORIDE 60 MG/1
CAPSULE, DELAYED RELEASE ORAL
Qty: 90 CAPSULE | Refills: 0 | Status: SHIPPED | OUTPATIENT
Start: 2021-08-31

## 2021-09-01 RX ORDER — AMLODIPINE BESYLATE 10 MG/1
TABLET ORAL
Qty: 90 TABLET | Refills: 0 | Status: SHIPPED | OUTPATIENT
Start: 2021-09-01

## 2021-09-08 RX ORDER — METOPROLOL SUCCINATE 100 MG/1
TABLET, EXTENDED RELEASE ORAL
Qty: 90 TABLET | Refills: 2 | Status: SHIPPED | OUTPATIENT
Start: 2021-09-08

## 2021-09-23 ENCOUNTER — OFFICE VISIT (OUTPATIENT)
Dept: ENDOCRINOLOGY | Age: 61
End: 2021-09-23
Payer: COMMERCIAL

## 2021-09-23 VITALS
WEIGHT: 232.2 LBS | TEMPERATURE: 97.9 F | SYSTOLIC BLOOD PRESSURE: 127 MMHG | BODY MASS INDEX: 41.14 KG/M2 | HEIGHT: 63 IN | OXYGEN SATURATION: 99 % | HEART RATE: 70 BPM | DIASTOLIC BLOOD PRESSURE: 78 MMHG | RESPIRATION RATE: 18 BRPM

## 2021-09-23 DIAGNOSIS — Z79.4 ENCOUNTER FOR LONG-TERM (CURRENT) USE OF INSULIN (HCC): ICD-10-CM

## 2021-09-23 DIAGNOSIS — E11.65 UNCONTROLLED TYPE 2 DIABETES MELLITUS WITH HYPERGLYCEMIA (HCC): Primary | ICD-10-CM

## 2021-09-23 DIAGNOSIS — R21 RASH: ICD-10-CM

## 2021-09-23 DIAGNOSIS — L73.9 FOLLICULITIS: ICD-10-CM

## 2021-09-23 LAB — HBA1C MFR BLD HPLC: 7.7 %

## 2021-09-23 PROCEDURE — 3051F HG A1C>EQUAL 7.0%<8.0%: CPT | Performed by: INTERNAL MEDICINE

## 2021-09-23 PROCEDURE — 83036 HEMOGLOBIN GLYCOSYLATED A1C: CPT | Performed by: INTERNAL MEDICINE

## 2021-09-23 PROCEDURE — 99215 OFFICE O/P EST HI 40 MIN: CPT | Performed by: INTERNAL MEDICINE

## 2021-09-23 RX ORDER — DULAGLUTIDE 1.5 MG/.5ML
1.5 INJECTION, SOLUTION SUBCUTANEOUS
Qty: 4 EACH | Refills: 6 | Status: SHIPPED | OUTPATIENT
Start: 2021-09-23 | End: 2022-03-07 | Stop reason: SDUPTHER

## 2021-09-23 NOTE — PROGRESS NOTES
MS. Devon Cooper IS  64YEAR OLD PATIENT       HPI    Patient here for   FIRST follow up after  initial visit of Type 2 diabetes mellitus   From  August 12 2021   She has definitely done better with changes I advised, but she now had to be on prednisone for skin rash   Going in for forehead abscess         Aug 12 2021     Referred : by self/pcp  She says she has waited for 1 year to get an appointment to see us  H/o diabetes for 7   years  2014  Current A1C is over 12  % and symptoms/problems include  SKIN LESION   SHE HAD a1c s in the past  Ar   8 % -   SO SHE IS PERPLEXED ALSO WITH SUDDEN LOSS OF CONTROL    She says this sudden disturbance, she had epidural shots and that disturbed her diabetes   She has folliculitis  -  Going on for 2 years   She saw dermatology quite often  And it is not  Getting better   She says these lesions are PAINFUL AND TRYING EVERYTHING TO HELP HER SKIN LESIONS   SHE RECEIVED ANTIBIOTIC  A MONTH AGO   Current diabetic medications include LANTUS  60  UNITS  AND GLIPIZIDE 5 MG  XR .   JANUMET WAS TRIED IN HIGHER DOSES BUT HAD TO LOWER THE GILLESPIE FOR BOWEL INCONTINENCE TOO   BUT SHE WAS ALSO ON STEROIDS AND MANY          Review of Systems   Skin rash present  And she has the forehead abscess     Physical Exam   Constitutional: She is oriented to person, place, and time. She appears well-developed and well-nourished. Psychiatric: She has a normal mood and affect. Vitals:    09/23/21 0939   BP: 127/78   BP 1 Location: Left upper arm   BP Patient Position: Sitting   BP Cuff Size: Large adult   Pulse: 70   Temp: 97.9 °F (36.6 °C)   TempSrc: Temporal   Resp: 18   Height: 5' 3\" (1.6 m)   Weight: 232 lb 3.2 oz (105.3 kg)   SpO2: 99%          Assessment and Plan :    1.  Type 2 DM un controlled : A1c is  7.7 %    From  Today by  POC    Compared to   11.4% from May 2021 compared to 9.8% from March 14, 2021 Sept 2021   4 weeks follow up   She has done well with DM management until steroids are started   Gave her a new insulin regimen to use for steroids       August 2021     Reviewed the glucose log : yes   The loss of control could be from stress and from her skin lesions which seem to be more bothersome with a lot of pain and itching  Discussed pathophysiology of diabetes type 2  Personally I felt like she would get the best help when I start her on insulin and she is agreeable to it  Discussed insulin therapy and actions of insulin. Stopped glyburide  Also starting her on Trulicity but patient is instructed to start each of the medication stepwise week after week and to take notes about any changes in her skin rash  Provided printed insulin instructions as well as diet      2. Hypoglycemia :  Educated on treating the hypoglycemia. 3. HTN : well controlled, On amlodipine and hydrochlorothiazide and is taking Lasix    4. Dyslipidemia : Lipids are not available for review    5. Diabetic complications :     A. Retinopathy-NO   educated on this complication,  regular f/u with ophthalmologist encouraged    B. Nephropathy - NO       C. CAD - has some form of arrythmia        7. Skin rash  -referred to ID and she says that she was not accepted   On Neurontin and Cymbalta  She has tried several medications with no help and it looks like referring her to ID consult to rule out M RSA    8. Diarrhea : METFORMIN - ? SKIN RASH  OR   ? BOWEL INCONTINENCE      9.  Forehead folliculits - abscess to be operated on         Reviewed results with patient and discussed the labs being ordered today/bnv  Patient voiced understanding of plan of care

## 2021-09-23 NOTE — LETTER
9/26/2021    Patient: Yaniv Gamble   YOB: 1960   Date of Visit: 9/23/2021     Francoise Weaver MD  [address] [place] [address] Drive  [id number] [place], [place] [id number]  Via Fax: [phone]    Dear Francoise Weaver MD,      Thank you for referring Ms. Princess Khan to [address] [place] [address] for evaluation. My notes for this consultation are attached. If you have questions, please do not hesitate to call me. I look forward to following your patient along with you.       Sincerely,    Dave Rider MD

## 2021-09-23 NOTE — PATIENT INSTRUCTIONS
SPECIFIC INSTRUCTIONS BELOW     DRINK water   Do not skip meals  Do not eat in between meals    Reduce carbs- pasta, rice, potatoes, bread   Try to avoid processed bread products like BISCUITS, CROISSANTS, MUFFINS    Do not drink juices or sodas, even if they are calorie zero or diet drinks and especially avoid using powders like crystalloids , FLORENCE-AIDS     Do not eat peanut butter     Do not eat sugar free cookies and cakes   Do not eat peaches, oranges, pineapples, raisins, grapes , canteloupe , honey dew, mangoes , watermelon  and fruit medleys        --------------------------------------------------------------------------------------------------------------------------------    UNABLE TO START MEDS  BECAUSE OF SKIN RASH        TRULICITY 1.5 MG ONCE  A WEEK       Check blood sugars immediately before each meal       Take  LANTUS  insulin  70 units  at bed time ( STOP GLYBURIDE )    Take NOVOLOG  OR   HUMALOG   Insulin  5 UNITS BEFORE EACH MEAL     Also, add additional NOVOLOG  as follows with meals  If blood sugars are[de-identified]    150-200 mg 1 units    201-250 mg 3 units    251-300 mg 5 units    301-350 mg 7 units    351-400 mg 9 units    401-450 mg 11 units    451-500 mg 13 units     Less than 70 mg NO INSULIN      ------------------------------------------------------------------------------------        ON STEROIDS       Increase  TRULICITY 1.5 MG ONCE  A WEEK       Check blood sugars immediately before each meal       Take  LANTUS  insulin  70 units  at bed time ( STOP GLYBURIDE )    Take NOVOLOG  OR   HUMALOG   Insulin  8  UNITS BEFORE EACH MEAL     Also, add additional NOVOLOG  as follows with meals  If blood sugars are[de-identified]    150-200 mg 2 units    201-250 mg 5 units    251-300 mg 7 units    301-350 mg 9 units    351-400 mg 11 units    401-450 mg 13 units    451-500 mg 15 units     Less than 70 mg NO INSULIN              -------------PAY ATTENTION TO THESE GENERAL INSTRUCTIONS -----------------      - The medications prescribed at this visit will not be available at pharmacy until 6 pm       - YOUR MED LIST IS NOT UP TO DATE AS SOME CHANGES ARE BEING MADE AFTER THE VISIT - FOLLOW SPECIFIC INSTRUCTIONS  ABOVE     -ANY tests other than blood work, which you opt to do  outside the  [facility] facilities, you are responsible for prior authorizations if  required    - [address] UP TO DATE ON YOUR AVS- PLEASE IGNORE     Results     *Normal results will not be notified by a phone call starting January 1 2021   *If you have an upcoming visit, the results will be discussed at the visit   *Please sign up for MY CHART if you want access to your lab and test results  *Abnormal results which require immediate attention will be notified by phone call   *Abnormal results which do not require immediate assistance will be notified in 1-2 weeks       Refills    -    have your pharmacy send us a refill request . Refills are done max for one year and a visit is a must before refills are extended    Follow up appointments -  highly encourage you to make it when you are checking out. We can accommodate you into the schedule based on your clinical situation, but not for extending refills beyond a year. Labs are important to give refills and is important to get labs before the visit     Phone calls  -  Allow  24 hrs.  for non-urgent calls to be returned  Prior authorization - It may take 2-4 weeks to process  Forms  -  FMLA, DMV etc., will take up to 2 weeks to process  Cancellations - please notify the office 2 days in advance   Samples  - will only be dispensed at visits       If not showing for the appointments and cancelling appointments within 24 hours are kept track of and three  of such situations in  two consecutive years will likely be considered for termination from the practice    -------------------------------------------------------------------------------------------------------------------

## 2022-01-07 ENCOUNTER — OFFICE VISIT (OUTPATIENT)
Dept: ENDOCRINOLOGY | Age: 62
End: 2022-01-07
Payer: COMMERCIAL

## 2022-01-07 VITALS
RESPIRATION RATE: 20 BRPM | OXYGEN SATURATION: 96 % | HEIGHT: 63 IN | DIASTOLIC BLOOD PRESSURE: 76 MMHG | TEMPERATURE: 98.7 F | BODY MASS INDEX: 40.64 KG/M2 | SYSTOLIC BLOOD PRESSURE: 130 MMHG | HEART RATE: 79 BPM | WEIGHT: 229.4 LBS

## 2022-01-07 DIAGNOSIS — Z79.4 ENCOUNTER FOR LONG-TERM (CURRENT) USE OF INSULIN (HCC): ICD-10-CM

## 2022-01-07 DIAGNOSIS — E11.65 UNCONTROLLED TYPE 2 DIABETES MELLITUS WITH HYPERGLYCEMIA (HCC): Primary | ICD-10-CM

## 2022-01-07 DIAGNOSIS — L73.9 FOLLICULITIS: ICD-10-CM

## 2022-01-07 LAB
ALBUMIN SERPL-MCNC: 4 G/DL (ref 3.8–4.8)
ALBUMIN/CREAT UR: 35 MG/G CREAT (ref 0–29)
ALBUMIN/GLOB SERPL: 1.3 {RATIO} (ref 1.2–2.2)
ALP SERPL-CCNC: 85 IU/L (ref 44–121)
ALT SERPL-CCNC: 27 IU/L (ref 0–32)
AST SERPL-CCNC: 19 IU/L (ref 0–40)
BILIRUB SERPL-MCNC: 0.3 MG/DL (ref 0–1.2)
BUN SERPL-MCNC: 11 MG/DL (ref 8–27)
BUN/CREAT SERPL: 17 (ref 12–28)
C PEPTIDE SERPL-MCNC: 3.4 NG/ML (ref 1.1–4.4)
CALCIUM SERPL-MCNC: 9.4 MG/DL (ref 8.7–10.3)
CHLORIDE SERPL-SCNC: 103 MMOL/L (ref 96–106)
CHOLEST SERPL-MCNC: 181 MG/DL (ref 100–199)
CO2 SERPL-SCNC: 27 MMOL/L (ref 20–29)
CREAT SERPL-MCNC: 0.65 MG/DL (ref 0.57–1)
CREAT UR-MCNC: 59.3 MG/DL
EST. AVERAGE GLUCOSE BLD GHB EST-MCNC: 206 MG/DL
GAD65 AB SER IA-ACNC: <5 U/ML (ref 0–5)
GLOBULIN SER CALC-MCNC: 3.1 G/DL (ref 1.5–4.5)
GLUCOSE SERPL-MCNC: 215 MG/DL (ref 65–99)
HBA1C MFR BLD: 8.8 % (ref 4.8–5.6)
HDLC SERPL-MCNC: 34 MG/DL
IMP & REVIEW OF LAB RESULTS: NORMAL
LDLC SERPL CALC-MCNC: 124 MG/DL (ref 0–99)
MICROALBUMIN UR-MCNC: 20.8 UG/ML
POTASSIUM SERPL-SCNC: 4.2 MMOL/L (ref 3.5–5.2)
PROT SERPL-MCNC: 7.1 G/DL (ref 6–8.5)
SODIUM SERPL-SCNC: 140 MMOL/L (ref 134–144)
TRIGL SERPL-MCNC: 126 MG/DL (ref 0–149)
VLDLC SERPL CALC-MCNC: 23 MG/DL (ref 5–40)

## 2022-01-07 PROCEDURE — 99214 OFFICE O/P EST MOD 30 MIN: CPT | Performed by: INTERNAL MEDICINE

## 2022-01-07 RX ORDER — PEN NEEDLE, DIABETIC 31 GX3/16"
NEEDLE, DISPOSABLE MISCELLANEOUS
Qty: 200 PEN NEEDLE | Refills: 6 | Status: SHIPPED | OUTPATIENT
Start: 2022-01-07

## 2022-01-07 RX ORDER — INSULIN ASPART 100 [IU]/ML
INJECTION, SOLUTION INTRAVENOUS; SUBCUTANEOUS
Qty: 30 ML | Refills: 6 | Status: SHIPPED | OUTPATIENT
Start: 2022-01-07 | End: 2022-03-07 | Stop reason: SDUPTHER

## 2022-01-07 RX ORDER — INSULIN GLARGINE 100 [IU]/ML
INJECTION, SOLUTION SUBCUTANEOUS
Qty: 30 ML | Refills: 6 | Status: SHIPPED | OUTPATIENT
Start: 2022-01-07 | End: 2022-03-07 | Stop reason: SDUPTHER

## 2022-01-07 NOTE — PROGRESS NOTES
MS. Michael Rucker IS  64YEAR OLD PATIENT       HPI    Patient here for  follow up after last  visit for  Type 2 diabetes mellitus   From  Sept 23 2021   In the interim,   She was refused to be seen by I.D   She is getting help from [facility]     She had a fall, right shoulder  Injury on top of  old rotator cuff    She gets steroids  On and off from ER doc as patient c/o rash and pain on lesions   She realized the problem with steroids and she plans to NOT TAKE steroids   C/o floaters in eyes       Sept 2021       She has definitely done better with changes I advised, but she now had to be on prednisone for skin rash   Going in for forehead abscess         Aug 12 2021     Referred : by self/pcp  She says she has waited for 1 year to get an appointment to see us  H/o diabetes for 7   years  2014  Current A1C is over 12  % and symptoms/problems include  SKIN LESION   SHE HAD a1c s in the past  Ar   8 % -   SO SHE IS PERPLEXED ALSO WITH SUDDEN LOSS OF CONTROL    She says this sudden disturbance, she had epidural shots and that disturbed her diabetes   She has folliculitis  -  Going on for 2 years   She saw dermatology quite often  And it is not  Getting better   She says these lesions are PAINFUL AND TRYING EVERYTHING TO HELP HER SKIN LESIONS   SHE RECEIVED ANTIBIOTIC  A MONTH AGO   Current diabetic medications include LANTUS  60  UNITS  AND GLIPIZIDE 5 MG  XR .   JANUMET WAS TRIED IN HIGHER DOSES BUT HAD TO LOWER THE GILLESPIE FOR BOWEL INCONTINENCE TOO   BUT SHE WAS ALSO ON STEROIDS AND MANY          Review of Systems   Skin rash present  And she has the forehead abscess     Physical Exam   Constitutional: She is oriented to person, place, and time. She appears well-developed and well-nourished. SKIN  Multiple abscesses,   And  She  A  Lesion on abdomen - infected   Psychiatric: She has a normal mood and affect.          Vitals:    01/07/22 1445   BP: 130/76   BP 1 Location: Left upper arm   BP Patient Position: Sitting BP Cuff Size: Large adult   Pulse: 79   Temp: 98.7 °F (37.1 °C)   TempSrc: Temporal   Resp: 20   Height: 5' 3\" (1.6 m)   Weight: 229 lb 6.4 oz (104.1 kg)   SpO2: 96%          Assessment and Plan :    1. Type 2 DM un controlled : A1c is  8.8 %    From    Jan 2022    Compared to    7.7 %    From  Today by  POC    Compared to   11.4% from May 2021 compared to 9.8% from March 14, 2021 Jan 2022     ASKING HER TO DEFER STEROIDS AS BEST AS POSSIBLE   Will stay away from trulicity   -  Because of abdominal   Abscesses   Blood sugar  log -  To be maintained     I increased insulin doses today   She is not able to wear a sensor given the fact of  Folliculitis   I am not able to understand why ID is not requested to help her         Sept 2021   4 weeks follow up   She has done well with DM management until steroids are started   Gave her a new insulin regimen to use for steroids       August 2021     Reviewed the glucose log : yes   The loss of control could be from stress and from her skin lesions which seem to be more bothersome with a lot of pain and itching  Discussed pathophysiology of diabetes type 2  Personally I felt like she would get the best help when I start her on insulin and she is agreeable to it  Discussed insulin therapy and actions of insulin. Stopped glyburide  Also starting her on Trulicity but patient is instructed to start each of the medication stepwise week after week and to take notes about any changes in her skin rash  Provided printed insulin instructions as well as diet      2. Hypoglycemia :  Educated on treating the hypoglycemia. 3. HTN : well controlled, On amlodipine and hydrochlorothiazide and is taking Lasix    4. Dyslipidemia : Lipids are  High, compliance emphasized     5. Diabetic complications :     A. Retinopathy-NO   educated on this complication,  regular f/u with ophthalmologist encouraged    B. Nephropathy - NO       C. CAD - has some form of arrythmia        7.  Skin rash  -referred to ID and she says that she was not accepted   On Neurontin and Cymbalta  She has tried several medications with no help and it looks like referring her to ID consult to rule out M RSA    8. Diarrhea : METFORMIN - ? SKIN RASH  OR   ? BOWEL INCONTINENCE      9. Diffuse  folliculits - abscess to be operated on   ID refused to see her       8.  Fatty liver  Disease         Reviewed results with patient and discussed the labs being ordered today/bnv  Patient voiced understanding of plan of care

## 2022-01-07 NOTE — LETTER
1/9/2022    Patient: Venus Blakely   YOB: 1960   Date of Visit: 1/7/2022     Zhane Ulloa MD  [address] [place] [address] Drive  [address] [facility] [address]  Via Fax: [phone]    Dear Zhane Ulloa MD,      Thank you for referring Ms. Zofia Mansfield to [phone] [place] [address] for evaluation. My notes for this consultation are attached. If you have questions, please do not hesitate to call me. I look forward to following your patient along with you.       Sincerely,    Shae Parson MD

## 2022-01-07 NOTE — PATIENT INSTRUCTIONS
SPECIFIC INSTRUCTIONS BELOW     DRINK water   Do not skip meals  Do not eat in between meals    Reduce carbs- pasta, rice, potatoes, bread   Try to avoid processed bread products like BISCUITS, CROISSANTS, MUFFINS    Do not drink juices or sodas, even if they are calorie zero or diet drinks and especially avoid using powders like crystalloids , FLORENCE-AIDS     Do not eat peanut butter     Do not eat sugar free cookies and cakes   Do not eat peaches, oranges, pineapples, raisins, grapes , canteloupe , honey dew, mangoes , watermelon  and fruit medleys        --------------------------------------------------------------------------------------------------------------------------------    STOP  TRULICITY       Start on  jardiance 10 mg a day  Before b-fast ( drink lot of water  )     Check blood sugars immediately before each meal       Take  LANTUS  insulin  70 units  at bed time     Take NOVOLOG     Insulin  7    UNITS BEFORE EACH MEAL     Also, add additional NOVOLOG  as follows with meals  If blood sugars are[de-identified]    150-200 mg 1 units    201-250 mg 3 units    251-300 mg 5 units    301-350 mg 7 units    351-400 mg 9 units    401-450 mg 11 units    451-500 mg 13 units     Less than 70 mg NO INSULIN      -------------PAY ATTENTION TO THESE GENERAL INSTRUCTIONS -----------------      - The medications prescribed at this visit will not be available at pharmacy until 6 pm       - YOUR MED LIST IS NOT UP TO DATE AS SOME CHANGES ARE BEING MADE AFTER THE VISIT - FOLLOW SPECIFIC INSTRUCTIONS  ABOVE     -ANY tests other than blood work, which you opt to do  outside the  [facility] facilities, you are responsible for prior authorizations if  required    - [address] [facility]- PLEASE IGNORE     Results     *Normal results will not be notified by a phone call starting January 1 2021   *If you have an upcoming visit, the results will be discussed at the visit   *Please sign up for MY CHART if you want access to your lab and test results  *Abnormal results which require immediate attention will be notified by phone call   *Abnormal results which do not require immediate assistance will be notified in 1-2 weeks       Refills    -    have your pharmacy send us a refill request . Refills are done max for one year and a visit is a must before refills are extended    Follow up appointments -  highly encourage you to make it when you are checking out. We can accommodate you into the schedule based on your clinical situation, but not for extending refills beyond a year. Labs are important to give refills and is important to get labs before the visit     Phone calls  -  Allow  24 hrs.  for non-urgent calls to be returned  Prior authorization - It may take 2-4 weeks to process  Forms  -  FMLA, DMV etc., will take up to 2 weeks to process  Cancellations - please notify the office 2 days in advance   Samples  - will only be dispensed at visits       If not showing for the appointments and cancelling appointments within 24 hours are kept track of and three  of such situations in  two consecutive years will likely be considered for termination from the practice    -------------------------------------------------------------------------------------------------------------------

## 2022-02-18 ENCOUNTER — PATIENT OUTREACH (OUTPATIENT)
Dept: CASE MANAGEMENT | Age: 62
End: 2022-02-18

## 2022-02-18 NOTE — PROGRESS NOTES
Ambulatory Care Management Note    Date/Time:  2/18/2022 3:06 PM    This patient was received as a referral from  [place]. [address] [place] [address] outreached to patient today to offer care management services. Introduction to self and role of care manager provided. Patient accepted care management services at this time. Follow up call scheduled at this time. Patient has Ambulatory Care Manager's contact number for for any questions or concerns. Ambulatory Care Management Note      This Ambulatory Care Manager (ACM) reviewed and updated the following screenings during this call; disease specific assessment     Patient's challenges to self-management identified:   medical condition      Medication Management:  good adherence    Advance Care Planning:   Does patient have an Advance Directive:  currently not on file; education provided   Brother Ross Carlos remains as [facility]. Decision Maker. [employer], Referrals, and Durable Medical Equipment: none given      Health Maintenance Due   Topic Date Due    COVID-19 Vaccine (1) Never done    Pneumococcal 0-64 years (1 of 2 - PPSV23) Never done    Shingrix Vaccine Age 50> (1 of 2) Never done    Foot Exam Q1  08/28/2021    Flu Vaccine (1) 09/01/2021     Health Maintenance Reviewed: COVID vaccine done. Patient was asked to consider health care goals that they would like to focus on with this ACM. ACM will follow up with patient to discuss goals and establish care plan in the next 7-14 days.        PCP/Specialist follow up:   Future Appointments   Date Time Provider Debbie Earl   3/7/2022  2:45 PM Luciano Russ MD CDE BS AMB

## 2022-03-05 LAB
ALBUMIN SERPL-MCNC: 4.3 G/DL (ref 3.8–4.8)
ALBUMIN/CREAT UR: 14 MG/G CREAT (ref 0–29)
ALBUMIN/GLOB SERPL: 1.4 {RATIO} (ref 1.2–2.2)
ALP SERPL-CCNC: 85 IU/L (ref 44–121)
ALT SERPL-CCNC: 21 IU/L (ref 0–32)
AST SERPL-CCNC: 21 IU/L (ref 0–40)
BILIRUB SERPL-MCNC: 0.3 MG/DL (ref 0–1.2)
BUN SERPL-MCNC: 11 MG/DL (ref 8–27)
BUN/CREAT SERPL: 15 (ref 12–28)
C PEPTIDE SERPL-MCNC: 3.3 NG/ML (ref 1.1–4.4)
CALCIUM SERPL-MCNC: 9.7 MG/DL (ref 8.7–10.3)
CHLORIDE SERPL-SCNC: 101 MMOL/L (ref 96–106)
CHOLEST SERPL-MCNC: 195 MG/DL (ref 100–199)
CO2 SERPL-SCNC: 25 MMOL/L (ref 20–29)
CREAT SERPL-MCNC: 0.71 MG/DL (ref 0.57–1)
CREAT UR-MCNC: 131.5 MG/DL
EGFR: 97 ML/MIN/1.73
EST. AVERAGE GLUCOSE BLD GHB EST-MCNC: 177 MG/DL
GAD65 AB SER IA-ACNC: <5 U/ML (ref 0–5)
GLOBULIN SER CALC-MCNC: 3 G/DL (ref 1.5–4.5)
GLUCOSE SERPL-MCNC: 173 MG/DL (ref 65–99)
HBA1C MFR BLD: 7.8 % (ref 4.8–5.6)
HDLC SERPL-MCNC: 37 MG/DL
IMP & REVIEW OF LAB RESULTS: NORMAL
INTERPRETATION: NORMAL
LDLC SERPL CALC-MCNC: 133 MG/DL (ref 0–99)
MICROALBUMIN UR-MCNC: 17.9 UG/ML
POTASSIUM SERPL-SCNC: 4.2 MMOL/L (ref 3.5–5.2)
PROT SERPL-MCNC: 7.3 G/DL (ref 6–8.5)
SODIUM SERPL-SCNC: 140 MMOL/L (ref 134–144)
TRIGL SERPL-MCNC: 139 MG/DL (ref 0–149)
VLDLC SERPL CALC-MCNC: 25 MG/DL (ref 5–40)

## 2022-03-07 ENCOUNTER — PATIENT OUTREACH (OUTPATIENT)
Dept: CASE MANAGEMENT | Age: 62
End: 2022-03-07

## 2022-03-07 ENCOUNTER — OFFICE VISIT (OUTPATIENT)
Dept: ENDOCRINOLOGY | Age: 62
End: 2022-03-07
Payer: COMMERCIAL

## 2022-03-07 VITALS
HEART RATE: 93 BPM | DIASTOLIC BLOOD PRESSURE: 78 MMHG | SYSTOLIC BLOOD PRESSURE: 137 MMHG | OXYGEN SATURATION: 96 % | HEIGHT: 63 IN | TEMPERATURE: 97.2 F | WEIGHT: 232.6 LBS | BODY MASS INDEX: 41.21 KG/M2

## 2022-03-07 DIAGNOSIS — E11.65 UNCONTROLLED TYPE 2 DIABETES MELLITUS WITH HYPERGLYCEMIA (HCC): Primary | ICD-10-CM

## 2022-03-07 DIAGNOSIS — Z79.4 ENCOUNTER FOR LONG-TERM (CURRENT) USE OF INSULIN (HCC): ICD-10-CM

## 2022-03-07 DIAGNOSIS — L73.9 FOLLICULITIS: ICD-10-CM

## 2022-03-07 PROCEDURE — 3051F HG A1C>EQUAL 7.0%<8.0%: CPT | Performed by: INTERNAL MEDICINE

## 2022-03-07 PROCEDURE — 99214 OFFICE O/P EST MOD 30 MIN: CPT | Performed by: INTERNAL MEDICINE

## 2022-03-07 RX ORDER — DULAGLUTIDE 1.5 MG/.5ML
1.5 INJECTION, SOLUTION SUBCUTANEOUS
Qty: 4 EACH | Refills: 6 | Status: SHIPPED | OUTPATIENT
Start: 2022-03-07 | End: 2022-04-06 | Stop reason: SDUPTHER

## 2022-03-07 RX ORDER — INSULIN GLARGINE 100 [IU]/ML
INJECTION, SOLUTION SUBCUTANEOUS
Qty: 30 ML | Refills: 6 | Status: SHIPPED | OUTPATIENT
Start: 2022-03-07 | End: 2022-08-04 | Stop reason: SDUPTHER

## 2022-03-07 RX ORDER — INSULIN ASPART 100 [IU]/ML
INJECTION, SOLUTION INTRAVENOUS; SUBCUTANEOUS
Qty: 30 ML | Refills: 6 | Status: SHIPPED | OUTPATIENT
Start: 2022-03-07 | End: 2022-08-04 | Stop reason: SDUPTHER

## 2022-03-07 NOTE — PROGRESS NOTES
Patient on Complex CM Enrollment Report/fu Contact. Unable to LM on voice mail with my contact information for return call; mail box not set up. Need to assess for ongoing needs and CCM enrollment/fu.

## 2022-03-07 NOTE — LETTER
3/9/2022    Patient: Kalli Arenas   YOB: 1960   Date of Visit: 3/7/2022     Rajan Cowart MD  [address] [place] [address] Drive  [id number] [place] [address]  Via Fax: [phone]    Dear Rajan Cowart MD,      Thank you for referring Ms. Monica Chang to [address] [place] [address] for evaluation. My notes for this consultation are attached. If you have questions, please do not hesitate to call me. I look forward to following your patient along with you.       Sincerely,    Iris Barron MD

## 2022-03-07 NOTE — PROGRESS NOTES
Ofe Saab is a 64 y.o. female here for   Chief Complaint   Patient presents with    Diabetes       1. Have you been to the ER, urgent care clinic since your last visit? Hospitalized since your last visit? - no    2. Have you seen or consulted any other health care providers outside of the [address] [place] [address] since your last visit?   Include any pap smears or colon screening.- Dermatologist, Orthopedic, Navin Vince, [address] [facility],[address], Rheumatologist 93

## 2022-03-07 NOTE — PATIENT INSTRUCTIONS
SPECIFIC INSTRUCTIONS BELOW       DRINK water   Do not skip meals  Do not eat in between meals    Reduce carbs- pasta, rice, potatoes, bread   Try to avoid processed bread products like BISCUITS, CROISSANTS, MUFFINS    Do not drink juices or sodas, even if they are calorie zero or diet drinks and especially avoid using powders like crystalloids , FLORENCE-AIDS     Do not eat peanut butter     Do not eat sugar free cookies and cakes   Do not eat peaches, oranges, pineapples, raisins, grapes , canteloupe , honey dew, mangoes , watermelon  and fruit medleys        --------------------------------------------------------------------------------------------------------------------------------    Resume   TRULICITY  1.5   mg a week      Start on  jardiance 10 mg a day  Before b-fast ( drink lot of water  )     Check blood sugars immediately before each meal       Take  LANTUS  insulin  70 units  at bed time     Take NOVOLOG     Insulin  8    UNITS BEFORE EACH MEAL     Also, add additional NOVOLOG  as follows with meals  If blood sugars are[de-identified]    150-200 mg 1 units    201-250 mg 3 units    251-300 mg 5 units    301-350 mg 7 units    351-400 mg 9 units    401-450 mg 11 units    451-500 mg 13 units     Less than 70 mg NO INSULIN            -------------PAY ATTENTION TO THESE GENERAL INSTRUCTIONS -----------------      - The medications prescribed at this visit will not be available at pharmacy until 6 pm       - YOUR MED LIST IS NOT UP TO DATE AS SOME CHANGES ARE BEING MADE AFTER THE VISIT - FOLLOW SPECIFIC INSTRUCTIONS  ABOVE     -ANY tests other than blood work, which you opt to do  outside the  [facility] facilities, you are responsible for prior authorizations if  required    - [address] [facility]- PLEASE IGNORE     Results     *Normal results will not be notified by a phone call starting January 1 2021   *If you have an upcoming visit, the results will be discussed at the visit   *Please sign up for MY CHART if you want access to your lab and test results  *Abnormal results which require immediate attention will be notified by phone call   *Abnormal results which do not require immediate assistance will be notified in 1-2 weeks       Refills    -    have your pharmacy send us a refill request . Refills are done max for one year and a visit is a must before refills are extended    Follow up appointments -  highly encourage you to make it when you are checking out. We can accommodate you into the schedule based on your clinical situation, but not for extending refills beyond a year. Labs are important to give refills and is important to get labs before the visit     Phone calls  -  Allow  24 hrs.  for non-urgent calls to be returned  Prior authorization - It may take 2-4 weeks to process  Forms  -  FMLA, DMV etc., will take up to 2 weeks to process  Cancellations - please notify the office 2 days in advance   Samples  - will only be dispensed at visits       If not showing for the appointments and cancelling appointments within 24 hours are kept track of and three  of such situations in  two consecutive years will likely be considered for termination from the practice    -------------------------------------------------------------------------------------------------------------------

## 2022-03-07 NOTE — PROGRESS NOTES
MS. Rita Amaya IS  64YEAR OLD PATIENT       HPI    Patient here for  follow up after last  visit for  Type 2 diabetes mellitus   From  Jan 2021     Her skin ulcer healed on abdomen by going to wound care   She has done well with DM until 2 weeks ago     She uses livongo   She got off the routine           Jan 2021     In the interim,   She was refused to be seen by I.D   She is getting help from Wound center     She had a fall, right shoulder  Injury on top of  old rotator cuff    She gets steroids  On and off from ER doc as patient c/o rash and pain on lesions   She realized the problem with steroids and she plans to NOT TAKE steroids   C/o floaters in eyes       Sept 2021       She has definitely done better with changes I advised, but she now had to be on prednisone for skin rash   Going in for forehead abscess         Aug 12 2021     Referred : by self/pcp  She says she has waited for 1 year to get an appointment to see us  H/o diabetes for 7   years  2014  Current A1C is over 12  % and symptoms/problems include  SKIN LESION   SHE HAD a1c s in the past  Ar   8 % -   SO SHE IS PERPLEXED ALSO WITH SUDDEN LOSS OF CONTROL    She says this sudden disturbance, she had epidural shots and that disturbed her diabetes   She has folliculitis  -  Going on for 2 years   She saw dermatology quite often  And it is not  Getting better   She says these lesions are PAINFUL AND TRYING EVERYTHING TO HELP HER SKIN LESIONS   SHE RECEIVED ANTIBIOTIC  A MONTH AGO   Current diabetic medications include LANTUS  60  UNITS  AND GLIPIZIDE 5 MG  XR .   JANUMET WAS TRIED IN HIGHER DOSES BUT HAD TO LOWER THE GILLESPIE FOR BOWEL INCONTINENCE TOO   BUT SHE WAS ALSO ON STEROIDS AND MANY          Review of Systems   Skin rash present     Physical Exam   Constitutional: She is oriented to person, place, and time. She appears well-developed and well-nourished.    SKIN  Multiple abscesses,   And  She  A  Lesion on abdomen - healed     Psychiatric: She has a normal mood and affect. Vitals:    03/07/22 1438   BP: 137/78   BP 1 Location: Left upper arm   BP Patient Position: Sitting   BP Cuff Size: Adult long   Pulse: 93   Temp: 97.2 °F (36.2 °C)   TempSrc: Temporal   Height: 5' 3\" (1.6 m)   Weight: 232 lb 9.6 oz (105.5 kg)   SpO2: 96%          Assessment and Plan :    1. Type 2 DM un controlled : A1c is  7.8 %   From march 3 2022  Compared to    8.8 %    From    Jan 2022    Compared to    7.7 %    From  Today by  POC    Compared to   11.4% from May 2021 compared to 9.8% from March 14, 2021 March 2022   cpep detectable   She is using Livongo meter ( cannot wear the sensor for folliculitis  )  But also has post meal checks included in here ( over 300 mg )- discouraged this behaviour as It is quite confusing   Asked her to write a log      Her motivation for diet management is fluctuant     Has checked sugars until 3 weeks ago      Stay on basal bolus regimen   She has YET to start on jardiance   ? Resume   trulicity      Stopped metformin for diarrhea       Jan 2022     ASKING HER TO DEFER STEROIDS AS BEST AS POSSIBLE   Will stay away from trulicity   -  Because of abdominal   Abscesses   Blood sugar  log -  To be maintained   I increased insulin doses today   She is not able to wear a sensor given the fact of  Folliculitis   I am not able to understand why ID is not requested to help her         Sept 2021   4 weeks follow up   She has done well with DM management until steroids are started   Gave her a new insulin regimen to use for steroids       August 2021     Reviewed the glucose log : yes   The loss of control could be from stress and from her skin lesions which seem to be more bothersome with a lot of pain and itching  Discussed pathophysiology of diabetes type 2  Personally I felt like she would get the best help when I start her on insulin and she is agreeable to it  Discussed insulin therapy and actions of insulin.   Stopped glyburide  Also starting her on Trulicity but patient is instructed to start each of the medication stepwise week after week and to take notes about any changes in her skin rash  Provided printed insulin instructions as well as diet      2. Hypoglycemia :  Educated on treating the hypoglycemia. 3. HTN : well controlled, On amlodipine and metoprolol ( HCTZ is being held as I am starting jardiance )    4. Dyslipidemia : Lipids are  High,  I am avoiding statin to minimize confusion with meds and skin rash     5. Diabetic complications :     A. Retinopathy-NO   educated on this complication,  regular f/u with ophthalmologist encouraged    B. Nephropathy - NO       C. CAD - has some form of arrythmia        7. Skin rash  -referred to ID and she says that she was not accepted   She has dermatologist follow up    On Neurontin and Cymbalta  She has tried several medications with no help and it looks like referring her to ID consult to rule out M RSA    8.   Diarrhea :  Resolved  With stopping  METFORMIN -       9  Fatty liver  Disease         Reviewed results with patient and discussed the labs being ordered today/bnv  Patient voiced understanding of plan of care

## 2022-03-14 ENCOUNTER — PATIENT OUTREACH (OUTPATIENT)
Dept: CASE MANAGEMENT | Age: 62
End: 2022-03-14

## 2022-03-14 NOTE — PROGRESS NOTES
[facility] 12/1/2021:  Cervical Radiculopathy/Diabetes f/u    Patient on Complex CM Enrollment Report/fu Contact. Unable to Leave message on voice mail with my contact information for return call. Need to assess for ongoing needs and CCM enrollment/fu.

## 2022-03-19 PROBLEM — R55 CONVULSIVE SYNCOPE: Status: ACTIVE | Noted: 2019-12-05

## 2022-03-19 PROBLEM — E66.01 SEVERE OBESITY (HCC): Status: ACTIVE | Noted: 2020-02-20

## 2022-03-20 PROBLEM — R41.82 ALTERED MENTAL STATUS, UNSPECIFIED: Status: ACTIVE | Noted: 2019-12-05

## 2022-04-06 DIAGNOSIS — E11.65 UNCONTROLLED TYPE 2 DIABETES MELLITUS WITH HYPERGLYCEMIA (HCC): ICD-10-CM

## 2022-04-06 DIAGNOSIS — Z79.4 ENCOUNTER FOR LONG-TERM (CURRENT) USE OF INSULIN (HCC): ICD-10-CM

## 2022-04-06 RX ORDER — DULAGLUTIDE 1.5 MG/.5ML
1.5 INJECTION, SOLUTION SUBCUTANEOUS
Qty: 6 ML | Refills: 2 | Status: SHIPPED | OUTPATIENT
Start: 2022-04-06 | End: 2022-08-04 | Stop reason: SDUPTHER

## 2022-05-19 ENCOUNTER — TRANSCRIBE ORDER (OUTPATIENT)
Dept: SCHEDULING | Age: 62
End: 2022-05-19

## 2022-05-19 DIAGNOSIS — M51.26 OTHER INTERVERTEBRAL DISC DISPLACEMENT, LUMBAR REGION: Primary | ICD-10-CM

## 2022-05-28 LAB
ALBUMIN SERPL-MCNC: 4.5 G/DL (ref 3.8–4.8)
ALBUMIN/CREAT UR: 10 MG/G CREAT (ref 0–29)
ALBUMIN/GLOB SERPL: 1.7 {RATIO} (ref 1.2–2.2)
ALP SERPL-CCNC: 97 IU/L (ref 44–121)
ALT SERPL-CCNC: 18 IU/L (ref 0–32)
AST SERPL-CCNC: 19 IU/L (ref 0–40)
BILIRUB SERPL-MCNC: 0.4 MG/DL (ref 0–1.2)
BUN SERPL-MCNC: 14 MG/DL (ref 8–27)
BUN/CREAT SERPL: 23 (ref 12–28)
CALCIUM SERPL-MCNC: 9.5 MG/DL (ref 8.7–10.3)
CHLORIDE SERPL-SCNC: 105 MMOL/L (ref 96–106)
CHOLEST SERPL-MCNC: 190 MG/DL (ref 100–199)
CO2 SERPL-SCNC: 26 MMOL/L (ref 20–29)
CREAT SERPL-MCNC: 0.61 MG/DL (ref 0.57–1)
CREAT UR-MCNC: 106.8 MG/DL
EGFR: 102 ML/MIN/1.73
EST. AVERAGE GLUCOSE BLD GHB EST-MCNC: 183 MG/DL
GLOBULIN SER CALC-MCNC: 2.6 G/DL (ref 1.5–4.5)
GLUCOSE SERPL-MCNC: 142 MG/DL (ref 65–99)
HBA1C MFR BLD: 8 % (ref 4.8–5.6)
HDLC SERPL-MCNC: 40 MG/DL
IMP & REVIEW OF LAB RESULTS: NORMAL
LDLC SERPL CALC-MCNC: 130 MG/DL (ref 0–99)
MICROALBUMIN UR-MCNC: 10.5 UG/ML
POTASSIUM SERPL-SCNC: 3.8 MMOL/L (ref 3.5–5.2)
PROT SERPL-MCNC: 7.1 G/DL (ref 6–8.5)
SODIUM SERPL-SCNC: 144 MMOL/L (ref 134–144)
TRIGL SERPL-MCNC: 108 MG/DL (ref 0–149)
VLDLC SERPL CALC-MCNC: 20 MG/DL (ref 5–40)

## 2022-06-03 ENCOUNTER — HOSPITAL ENCOUNTER (OUTPATIENT)
Dept: MRI IMAGING | Age: 62
Discharge: HOME | End: 2022-06-03
Payer: COMMERCIAL

## 2022-06-03 DIAGNOSIS — M51.26 OTHER INTERVERTEBRAL DISC DISPLACEMENT, LUMBAR REGION: ICD-10-CM

## 2022-06-03 PROCEDURE — 72148 MRI LUMBAR SPINE W/O DYE: CPT

## 2022-08-04 ENCOUNTER — OFFICE VISIT (OUTPATIENT)
Dept: ENDOCRINOLOGY | Age: 62
End: 2022-08-04
Payer: COMMERCIAL

## 2022-08-04 VITALS
OXYGEN SATURATION: 96 % | TEMPERATURE: 98.7 F | SYSTOLIC BLOOD PRESSURE: 132 MMHG | BODY MASS INDEX: 42.31 KG/M2 | HEART RATE: 94 BPM | HEIGHT: 63 IN | DIASTOLIC BLOOD PRESSURE: 88 MMHG | WEIGHT: 238.8 LBS

## 2022-08-04 DIAGNOSIS — E11.65 UNCONTROLLED TYPE 2 DIABETES MELLITUS WITH HYPERGLYCEMIA (HCC): Primary | ICD-10-CM

## 2022-08-04 DIAGNOSIS — L73.9 FOLLICULITIS: ICD-10-CM

## 2022-08-04 DIAGNOSIS — Z79.4 ENCOUNTER FOR LONG-TERM (CURRENT) USE OF INSULIN (HCC): ICD-10-CM

## 2022-08-04 DIAGNOSIS — E11.65 UNCONTROLLED TYPE 2 DIABETES MELLITUS WITH HYPERGLYCEMIA (HCC): ICD-10-CM

## 2022-08-04 LAB — HBA1C MFR BLD HPLC: 7.9 %

## 2022-08-04 PROCEDURE — 83036 HEMOGLOBIN GLYCOSYLATED A1C: CPT | Performed by: INTERNAL MEDICINE

## 2022-08-04 PROCEDURE — 3051F HG A1C>EQUAL 7.0%<8.0%: CPT | Performed by: INTERNAL MEDICINE

## 2022-08-04 PROCEDURE — 99214 OFFICE O/P EST MOD 30 MIN: CPT | Performed by: INTERNAL MEDICINE

## 2022-08-04 PROCEDURE — 95251 CONT GLUC MNTR ANALYSIS I&R: CPT | Performed by: INTERNAL MEDICINE

## 2022-08-04 RX ORDER — INSULIN ASPART 100 [IU]/ML
INJECTION, SOLUTION INTRAVENOUS; SUBCUTANEOUS
Qty: 30 ML | Refills: 6 | Status: SHIPPED | OUTPATIENT
Start: 2022-08-04

## 2022-08-04 RX ORDER — GLIPIZIDE 10 MG/1
10 TABLET ORAL DAILY
COMMUNITY
Start: 2021-11-01 | End: 2022-08-04 | Stop reason: ALTCHOICE

## 2022-08-04 RX ORDER — ATORVASTATIN CALCIUM 20 MG/1
20 TABLET, FILM COATED ORAL DAILY
Qty: 90 TABLET | Refills: 3 | Status: SHIPPED | OUTPATIENT
Start: 2022-08-04 | End: 2022-08-04 | Stop reason: SDUPTHER

## 2022-08-04 RX ORDER — DULAGLUTIDE 1.5 MG/.5ML
1.5 INJECTION, SOLUTION SUBCUTANEOUS
Qty: 6 ML | Refills: 2 | Status: SHIPPED | OUTPATIENT
Start: 2022-08-04

## 2022-08-04 RX ORDER — INSULIN GLARGINE 100 [IU]/ML
INJECTION, SOLUTION SUBCUTANEOUS
Qty: 30 ML | Refills: 6 | Status: SHIPPED | OUTPATIENT
Start: 2022-08-04

## 2022-08-04 RX ORDER — LIDOCAINE 50 MG/G
PATCH TOPICAL
COMMUNITY
Start: 2022-07-16

## 2022-08-04 RX ORDER — ATORVASTATIN CALCIUM 20 MG/1
20 TABLET, FILM COATED ORAL DAILY
Qty: 90 TABLET | Refills: 3 | Status: SHIPPED | OUTPATIENT
Start: 2022-08-04

## 2022-08-04 NOTE — LETTER
8/8/2022    Patient: Lili Young   YOB: 1960   Date of Visit: 8/4/2022     Kinga Londono, [address] [facility] [place] [address]  Via Fax: [phone]    Dear Kinga Londono MD,      Thank you for referring Ms. Jared Byrne to [address] [place] [address] for evaluation. My notes for this consultation are attached. If you have questions, please do not hesitate to call me. I look forward to following your patient along with you.       Sincerely,    Chante Choe MD

## 2022-08-04 NOTE — PATIENT INSTRUCTIONS
SPECIFIC INSTRUCTIONS BELOW       DRINK water   Do not skip meals  Do not eat in between meals    Reduce carbs- pasta, rice, potatoes, bread   Try to avoid processed bread products like BISCUITS, CROISSANTS, MUFFINS    Do not drink juices or sodas, even if they are calorie zero or diet drinks and especially avoid using powders like crystalloids , FLORENCE-AIDS     Do not eat peanut butter     Do not eat sugar free cookies and cakes   Do not eat peaches, oranges, pineapples, raisins, grapes , canteloupe , honey dew, mangoes , watermelon  and fruit medleys        --------------------------------------------------------------------------------------------------------------      TRULICITY  1.5   mg a week       jardiance 10 mg a day  Before b-fast ( drink lot of water  )     Check blood sugars immediately before each meal       Take  LANTUS  insulin  70 units  at bed time     Take NOVOLOG     Insulin  8    UNITS BEFORE EACH MEAL     Also, add additional NOVOLOG  as follows with meals  If blood sugars are[de-identified]    150-200 mg 1 units    201-250 mg 3 units    251-300 mg 5 units    301-350 mg 7 units    351-400 mg 9 units    401-450 mg 11 units    451-500 mg 13 units     Less than 70 mg NO INSULIN                -------------PAY ATTENTION TO THESE GENERAL INSTRUCTIONS -----------------      - The medications prescribed at this visit will not be available at pharmacy until 6 pm       - YOUR MED LIST IS NOT UP TO DATE AS SOME CHANGES ARE BEING MADE AFTER THE VISIT - FOLLOW SPECIFIC INSTRUCTIONS  ABOVE     -ANY tests other than blood work, which you opt to do  outside the  [facility] facilities, you are responsible for prior authorizations if  required    - [address] [facility]- PLEASE IGNORE     Results     *Normal results will not be notified by a phone call starting January 1 2021   *If you have an upcoming visit, the results will be discussed at the visit   *Please sign up for [employer] CHART if you want access to your lab and test results  *Abnormal results which require immediate attention will be notified by phone call   *Abnormal results which do not require immediate assistance will be notified in 1-2 weeks       Refills    -    have your pharmacy send us a refill request . Refills are done max for one year and a visit is a must before refills are extended    Follow up appointments -  highly encourage you to make it when you are checking out. We can accommodate you into the schedule based on your clinical situation, but not for extending refills beyond a year. Labs are important to give refills and is important to get labs before the visit     Phone calls  -  Allow  24 hrs.  for non-urgent calls to be returned  Prior authorization - It may take 2-4 weeks to process  Forms  -  FMLA, DMV etc., will take up to 2 weeks to process  Cancellations - please notify the office 2 days in advance   Samples  - will only be dispensed at visits       If not showing for the appointments and cancelling appointments within 24 hours are kept track of and three  of such situations in  two consecutive years will likely be considered for termination from the practice    -------------------------------------------------------------------------------------------------------------------

## 2022-08-04 NOTE — PROGRESS NOTES
MS. Zeny Childers IS  64YEAR OLD PATIENT       HPI    Patient here for  follow up after last  visit for  Type 2 diabetes mellitus   From  March 2022     Gained 6 lbs   She has severe back pain  - on lidocaine patches   On steroid shots     She also is suffering from skin ulcers on head area ( forehead region)        March 2022      Her skin ulcer healed on abdomen by going to wound care   She has done well with DM until 2 weeks ago   She uses livongo   She got off the routine         Aug 12 2021     Referred : by self/pcp  She says she has waited for 1 year to get an appointment to see us  H/o diabetes for 7   years  2014  Current A1C is over 12  % and symptoms/problems include  SKIN LESION   SHE HAD a1c s in the past  Ar   8 % -   SO SHE IS PERPLEXED ALSO WITH SUDDEN LOSS OF CONTROL    She says this sudden disturbance, she had epidural shots and that disturbed her diabetes   She has folliculitis  -  Going on for 2 years   She saw dermatology quite often  And it is not  Getting better   She says these lesions are PAINFUL AND TRYING EVERYTHING TO HELP HER SKIN LESIONS   SHE RECEIVED ANTIBIOTIC  A MONTH AGO   Current diabetic medications include LANTUS  60  UNITS  AND GLIPIZIDE 5 MG  XR .   JANUMET WAS TRIED IN HIGHER DOSES BUT HAD TO LOWER THE GILLESPIE FOR BOWEL INCONTINENCE TOO   BUT SHE WAS ALSO ON STEROIDS AND MANY          Review of Systems   Skin rash present     Physical Exam   Constitutional: She is oriented to person, place, and time. She appears well-developed and well-nourished. SKIN  Multiple abscesses,   And  She  A  Lesion on abdomen - healed     Psychiatric: She has a normal mood and affect.          Vitals:    08/04/22 1008   BP: 132/88   BP 1 Location: Left upper arm   BP Patient Position: Sitting   BP Cuff Size: Large adult   Pulse: 94   Temp: 98.7 °F (37.1 °C)   TempSrc: Temporal   Height: 5' 3\" (1.6 m)   Weight: 238 lb 12.8 oz (108.3 kg)   SpO2: 96%              Lab Results   Component Value Date/Time Hemoglobin A1c 8.0 (H) 05/27/2022 04:32 PM    Hemoglobin A1c 7.8 (H) 03/03/2022 04:14 PM    Hemoglobin A1c 8.8 (H) 12/30/2021 01:49 PM    Hemoglobin A1c, External 11.4 05/20/2021 12:00 AM    Glucose 142 (H) 05/27/2022 04:32 PM    Glucose (POC) 321 (H) 05/17/2021 04:47 PM    Microalb/Creat ratio (ug/mg creat.) 10 05/27/2022 04:32 PM    LDL, calculated 130 (H) 05/27/2022 04:32 PM    LDL, calculated 102 (H) 02/20/2020 11:34 AM    Creatinine 0.61 05/27/2022 04:32 PM      Lab Results   Component Value Date/Time    Cholesterol, total 190 05/27/2022 04:32 PM    HDL Cholesterol 40 05/27/2022 04:32 PM    LDL, calculated 130 (H) 05/27/2022 04:32 PM    LDL, calculated 102 (H) 02/20/2020 11:34 AM    Triglyceride 108 05/27/2022 04:32 PM     Lab Results   Component Value Date/Time    ALT (SGPT) 18 05/27/2022 04:32 PM    Alk. phosphatase 97 05/27/2022 04:32 PM    Bilirubin, direct 0.18 06/23/2014 04:57 PM    Bilirubin, total 0.4 05/27/2022 04:32 PM    Albumin 4.5 05/27/2022 04:32 PM    Protein, total 7.1 05/27/2022 04:32 PM    Ammonia, plasma 23 09/23/2019 10:58 PM    PLATELET 579 04/09/0991 06:50 PM       Lab Results   Component Value Date/Time    GFR est non-AA 96 12/30/2021 01:49 PM    GFR est  12/30/2021 01:49 PM    Creatinine 0.61 05/27/2022 04:32 PM    BUN 14 05/27/2022 04:32 PM    Sodium 144 05/27/2022 04:32 PM    Potassium 3.8 05/27/2022 04:32 PM    Chloride 105 05/27/2022 04:32 PM    CO2 26 05/27/2022 04:32 PM    Magnesium 1.8 03/27/2021 11:15 PM    Phosphorus 4.1 12/21/2016 11:11 AM     Lab Results   Component Value Date/Time    TSH 1.89 09/23/2019 09:26 PM              Assessment and Plan :    1.  Type 2 DM un controlled : A1c is    7.9 %   from    august 2022 by POC   comapred to   8%   from    May 2022   compared to    7.8 %   From march 3 2022  Compared to    8.8 %    From    Jan 2022    Compared to    7.7 %    From  Today by  POC    Compared to   11.4% from May 2021 compared to 9.8% from March 14, 2021 August 2022     Lost control - from hospitalization and  being on steroid shots   For the past 15 days, she is doing well     STOP GLIPIZIDE as she is on basal bolus regimen   Reviewed   Rickyw AGP  report for 14 days and discussed with pt    - 14 day average glucose 172  -GMI  7.4 %   - 31% for high ; 8 %  very high   and   0 % low sugars and % in Target  Range  61%    - percent of utiization 98.5 %  - CV% 29.7%          March 2022   cpep detectable   She is using Livongo meter ( cannot wear the sensor for folliculitis  )  But also has post meal checks included in here ( over 300 mg )- discouraged this behaviour as It is quite confusing   Asked her to write a log      Her motivation for diet management is fluctuant   Has checked sugars until 3 weeks ago    Stay on basal bolus regimen   She has YET to start on jardiance   ? Resume   trulicity    Stopped metformin for diarrhea         2. Hypoglycemia :  Educated on treating the hypoglycemia. 3. HTN : well controlled, On amlodipine and metoprolol ( HCTZ is being held as I am starting jardiance )    4. Dyslipidemia : Lipids are  High,  start on lipitor 20 mg at hs     5. Diabetic complications :     A. Retinopathy-NO   educated on this complication,  regular f/u with ophthalmologist encouraged    B. Nephropathy - NO       C. CAD - has some form of arrythmia        7. Multiple abscesses :  head area  -  On Neurontin and Cymbalta  She has tried several medications with no help and I felt getting an  ID consultation which never worked out for some unknown reason to rule out MRSA      8.   Diarrhea :  Resolved  With stopping  METFORMIN -       9  Fatty liver  Disease         Reviewed results with patient and discussed the labs being ordered today/bnv  Patient voiced understanding of plan of care

## 2022-10-05 ENCOUNTER — TELEPHONE (OUTPATIENT)
Dept: ENDOCRINOLOGY | Age: 62
End: 2022-10-05

## 2022-10-05 NOTE — TELEPHONE ENCOUNTER
States she is having side affects from Gregory. Wondering what to do, would like to try something else. Please advise if need more info.  IVELISSE

## 2022-10-06 NOTE — TELEPHONE ENCOUNTER
Pt has been on Jardiance for about 6 weeks. She stated 3 days after starting Jardiance she started experiencing dry mouth. She can't eat tomatoes now because it gave her a raw tongue. She's been having aching in her legs, frequent urination. She's gotten a yeast infection and PCP gave her Diflucan. Her BG has been more out of control being on the Adair Idol.  Before it was running 120-175 and now they stay over 225

## 2022-10-10 ENCOUNTER — PATIENT MESSAGE (OUTPATIENT)
Dept: ENDOCRINOLOGY | Age: 62
End: 2022-10-10

## 2022-10-10 NOTE — TELEPHONE ENCOUNTER
Called patient to advise her of Dr Michael Rae comments. Unable to reach her and unable to leave voicemail message for return call as voicemail box is full. Sent Dr Michael Rae comments via [employer].

## 2022-10-12 ENCOUNTER — TELEPHONE (OUTPATIENT)
Dept: ENDOCRINOLOGY | Age: 62
End: 2022-10-12

## 2022-10-13 NOTE — TELEPHONE ENCOUNTER
Attempted to return call to patient. Call went straight to voicemail and the voicemail box is full. Will send [employer] message.

## 2022-12-19 ENCOUNTER — VIRTUAL VISIT (OUTPATIENT)
Dept: ENDOCRINOLOGY | Age: 62
End: 2022-12-19
Payer: COMMERCIAL

## 2022-12-19 DIAGNOSIS — Z79.4 ENCOUNTER FOR LONG-TERM (CURRENT) USE OF INSULIN (HCC): ICD-10-CM

## 2022-12-19 DIAGNOSIS — E11.65 UNCONTROLLED TYPE 2 DIABETES MELLITUS WITH HYPERGLYCEMIA (HCC): Primary | ICD-10-CM

## 2022-12-19 PROCEDURE — 95251 CONT GLUC MNTR ANALYSIS I&R: CPT | Performed by: INTERNAL MEDICINE

## 2022-12-19 PROCEDURE — 99214 OFFICE O/P EST MOD 30 MIN: CPT | Performed by: INTERNAL MEDICINE

## 2022-12-19 PROCEDURE — 3046F HEMOGLOBIN A1C LEVEL >9.0%: CPT | Performed by: INTERNAL MEDICINE

## 2022-12-19 NOTE — PATIENT INSTRUCTIONS
SPECIFIC INSTRUCTIONS BELOW     DRINK water   Do not skip meals  Do not eat in between meals    Reduce carbs- pasta, rice, potatoes, bread   Try to avoid processed bread products like BISCUITS, CROISSANTS, MUFFINS    Do not drink juices or sodas, even if they are calorie zero or diet drinks and especially avoid using powders like crystalloids , FLORENCE-AIDS     Do not eat peanut butter     Do not eat sugar free cookies and cakes   Do not eat peaches, oranges, pineapples, raisins, grapes , canteloupe , honey dew, mangoes , watermelon  and fruit medleys        --------------------------------------------------------------------------------------------------------------      TRULICITY  1.5   mg a week        Check blood sugars immediately before each meal       Take  LANTUS  insulin  70 units  at bed time     Take NOVOLOG     Insulin  8    UNITS BEFORE EACH MEAL     Also, add additional NOVOLOG  as follows with meals  If blood sugars are[de-identified]    150-200 mg 1 units    201-250 mg 3 units    251-300 mg 5 units    301-350 mg 7 units    351-400 mg 9 units    401-450 mg 11 units    451-500 mg 13 units     Less than 70 mg NO INSULIN          -------------PAY ATTENTION TO THESE GENERAL INSTRUCTIONS -----------------      - The medications prescribed at this visit will not be available at pharmacy until 6 pm       - YOUR MED LIST IS NOT UP TO DATE AS SOME CHANGES ARE BEING MADE AFTER THE VISIT - FOLLOW SPECIFIC INSTRUCTIONS  ABOVE     -ANY tests other than blood work, which you opt to do  outside the  [facility] imaging facilities, you are responsible for prior authorizations if  required    - [address] [facility]- PLEASE IGNORE     Results     *Normal results will not be notified by a phone call starting January 1 2021   *If you have an upcoming visit, the results will be discussed at the visit   *Please sign up for MY CHART if you want access to your lab and test results  *Abnormal results which require immediate attention will be notified by phone call   *Abnormal results which do not require immediate assistance will be notified in 1-2 weeks       Refills    -    have your pharmacy send us a refill request . Refills are done max for one year and a visit is a must before refills are extended    Follow up appointments -  highly encourage you to make it when you are checking out. We can accommodate you into the schedule based on your clinical situation, but not for extending refills beyond a year. Labs are important to give refills and is important to get labs before the visit     Phone calls  -  Allow  24 hrs.  for non-urgent calls to be returned  Prior authorization - It may take 2-4 weeks to process  Forms  -  FMLA, DMV etc., will take up to 2 weeks to process  Cancellations - please notify the office 2 days in advance   Samples  - will only be dispensed at visits       If not showing for the appointments and cancelling appointments within 24 hours are kept track of and three  of such situations in  two consecutive years will likely be considered for termination from the practice    -------------------------------------------------------------------------------------------------------------------

## 2022-12-19 NOTE — PROGRESS NOTES
Aug 2022-[facility]3] for ruptured disks x 4 days    Patient does not have her [employer] reader with her but will bring it to the office for download Thursday morning.     No refills needed

## 2022-12-19 NOTE — PROGRESS NOTES
**THIS IS A VIRTUAL VISIT VIA AUDIO- VIDEO SYNCHRONOUS DISCUSSION. PATIENT AGREED TO HAVE THEIR CARE DELIVERED OVER A [facility] ME VIDEO VISIT IN PLACE OF THEIR REGULARLY SCHEDULED OFFICE VISIT**   Pt  is aware that this is a billable encounter and is responsible for copays/deductibles   Patient gave a verbal consent to proceed with virtual video visit   Patient is at home and I, the provider,  am at the office care diabetes [facility] end[facility]ology        MS. Tatianna Magallon IS  64YEAR OLD PATIENT       HPI    Patient here for  follow up after last  visit for  Type 2 diabetes mellitus   From  August 2022       Pt got admitted with  4 disc prolapses   She  has the forehead ulcer  going on. .     Will be seeing new [facility] orthopedics  She is waiting to  see neurosurgeon now as she has sciatica       She has stopped jardiance   She does not have trulicity   because of COST ( waiting for Social security approval  )         August 2022     Gained 6 lbs   She has severe back pain  - on lidocaine patches   On steroid shots   She also is suffering from skin ulcers on head area ( forehead region)        March 2022      Her skin ulcer healed on abdomen by going to wound care   She has done well with DM until 2 weeks ago   She uses livongo   She got off the routine         Aug 12 2021     Referred : by self/pcp  She says she has waited for 1 year to get an appointment to see us  H/o diabetes for 7   years  2014  Current A1C is over 12  % and symptoms/problems include  SKIN LESION   SHE HAD a1c s in the past  Ar   8 % -   SO SHE IS PERPLEXED ALSO WITH SUDDEN LOSS OF CONTROL    She says this sudden disturbance, she had epidural shots and that disturbed her diabetes   She has folliculitis  -  Going on for 2 years   She saw dermatology quite often  And it is not  Getting better   She says these lesions are PAINFUL AND TRYING EVERYTHING TO HELP HER SKIN LESIONS   SHE RECEIVED ANTIBIOTIC  A MONTH AGO   Current diabetic medications include LANTUS  60  UNITS  AND GLIPIZIDE 5 MG  XR .   JANUMET WAS TRIED IN HIGHER DOSES BUT HAD TO LOWER THE GILLESPIE FOR BOWEL INCONTINENCE TOO   BUT SHE WAS ALSO ON STEROIDS AND MANY          Review of Systems   Skin rash present     Physical Exam   Constitutional: She is oriented to person, place, and time. She appears well-developed and well-nourished. SKIN  Multiple abscesses,   And  She  A  Lesion on abdomen - healed     Psychiatric: She has a normal mood and affect. Lab Results   Component Value Date/Time    Hemoglobin A1c 9.2 (H) 12/09/2022 03:10 PM    Hemoglobin A1c 8.0 (H) 05/27/2022 04:32 PM    Hemoglobin A1c 7.8 (H) 03/03/2022 04:14 PM    Hemoglobin A1c, External 11.4 05/20/2021 12:00 AM    Glucose 235 (H) 12/09/2022 03:10 PM    Glucose (POC) 321 (H) 05/17/2021 04:47 PM    Microalb/Creat ratio (ug/mg creat.) 8 12/09/2022 03:10 PM    LDL, calculated 134 (H) 12/09/2022 03:10 PM    LDL, calculated 102 (H) 02/20/2020 11:34 AM    Creatinine 0.84 12/09/2022 03:10 PM      Lab Results   Component Value Date/Time    Cholesterol, total 204 (H) 12/09/2022 03:10 PM    HDL Cholesterol 41 12/09/2022 03:10 PM    LDL, calculated 134 (H) 12/09/2022 03:10 PM    LDL, calculated 102 (H) 02/20/2020 11:34 AM    Triglyceride 164 (H) 12/09/2022 03:10 PM     Lab Results   Component Value Date/Time    ALT (SGPT) 19 12/09/2022 03:10 PM    Alk.  phosphatase 111 12/09/2022 03:10 PM    Bilirubin, direct 0.18 06/23/2014 04:57 PM    Bilirubin, total 0.4 12/09/2022 03:10 PM    Albumin 4.5 12/09/2022 03:10 PM    Protein, total 7.4 12/09/2022 03:10 PM    Ammonia, plasma 23 09/23/2019 10:58 PM    PLATELET 573 07/08/9930 06:50 PM       Lab Results   Component Value Date/Time    GFR est non-AA 96 12/30/2021 01:49 PM    GFR est  12/30/2021 01:49 PM    Creatinine 0.84 12/09/2022 03:10 PM    BUN 15 12/09/2022 03:10 PM    Sodium 138 12/09/2022 03:10 PM    Potassium 4.4 12/09/2022 03:10 PM    Chloride 99 12/09/2022 03:10 PM    CO2 22 12/09/2022 03:10 PM    Magnesium 1.8 03/27/2021 11:15 PM    Phosphorus 4.1 12/21/2016 11:11 AM     Lab Results   Component Value Date/Time    TSH 1.89 09/23/2019 09:26 PM              Assessment and Plan :    1. Type 2 DM un controlled : A1c is    9.2 %    from   dec 2022   compared to  7.9 %   from    august 2022 by POC   comapred to   8%   from    May 2022   compared to    7.8 %   From march 3 2022  Compared to    8.8 %    From    Jan 2022    Compared to    7.7 %    From  Today by  POC    Compared to   11.4% from May 2021 compared to 9.8% from March 14, 2021    Dec 2022    More loss of control - from hospitalization and  being on steroid shots   she is on basal bolus regimen     Could nto afford trulicity   Could nto take jardiance  -  because of yeast infections   Cannot take metformin  because of loose stools       Reviewed   Libreview AGP  report for 14 days and discussed with pt    - 14 day average glucose   -GMI     - 31% for high ; 8 %  very high   and   0 % low sugars and % in Target  Range  61%    - percent of utiization 98.5 %  - CV% 29.7%      August 2022     Lost control - from hospitalization and  being on steroid shots   For the past 15 days, she is doing well   STOP GLIPIZIDE as she is on basal bolus regimen   Reviewed   Libreview AGP  report for 14 days and discussed with pt    - 14 day average glucose 172  -GMI  7.4 %   - 31% for high ; 8 %  very high   and   0 % low sugars and % in Target  Range  61%    - percent of utiization 98.5 %  - CV% 29.7%          March 2022   cpep detectable   She is using Livongo meter ( cannot wear the sensor for folliculitis  )  But also has post meal checks included in here ( over 300 mg )- discouraged this behaviour as It is quite confusing   Asked her to write a log      Her motivation for diet management is fluctuant   Has checked sugars until 3 weeks ago    Stay on basal bolus regimen   She has YET to start on jardiance   ?  Resume   trulicity    Stopped metformin for diarrhea         2. Hypoglycemia :  Educated on treating the hypoglycemia. 3. HTN : well controlled, On amlodipine and metoprolol ( HCTZ is being held as I am starting jardiance )    4. Dyslipidemia : Lipids are  High,   double compliance on lipitor 20 mg at hs     5. Diabetic complications :     A. Retinopathy-NO   educated on this complication,  regular f/u with ophthalmologist encouraged    B. Nephropathy - NO       C. CAD - has some form of arrythmia        7. Multiple abscesses :  head area  -  On Neurontin and Cymbalta  She has tried several medications with no help and I felt getting an  ID consultation which never worked out for some unknown reason to rule out MRSA      8.   Fatty liver  Disease         Reviewed results with patient and discussed the labs being ordered today/bnv  Patient voiced understanding of plan of care

## 2022-12-23 ENCOUNTER — TELEPHONE (OUTPATIENT)
Dept: ENDOCRINOLOGY | Age: 62
End: 2022-12-23

## 2022-12-23 NOTE — TELEPHONE ENCOUNTER
Pt called to advise she is faxing food log and would like to stop by office to discuss glucose monitor. Food log received and handed to nurse.

## 2022-12-23 NOTE — TELEPHONE ENCOUNTER
Patient came to office and brought her Echav[employer] reader for download. Downloaded readings and placed them in Dr Yamil Suazo folder for review. Advised patient that Dr Sima Montilla will not return to the office until 01/10/23 so she will not see the readings until then. Patient verbalized understanding.

## 2023-03-29 ENCOUNTER — OFFICE VISIT (OUTPATIENT)
Dept: ENDOCRINOLOGY | Age: 63
End: 2023-03-29
Payer: COMMERCIAL

## 2023-03-29 VITALS
OXYGEN SATURATION: 95 % | HEIGHT: 63 IN | BODY MASS INDEX: 42.13 KG/M2 | TEMPERATURE: 96.9 F | DIASTOLIC BLOOD PRESSURE: 73 MMHG | HEART RATE: 45 BPM | SYSTOLIC BLOOD PRESSURE: 156 MMHG | WEIGHT: 237.8 LBS

## 2023-03-29 DIAGNOSIS — Z79.4 ENCOUNTER FOR LONG-TERM (CURRENT) USE OF INSULIN (HCC): ICD-10-CM

## 2023-03-29 DIAGNOSIS — E11.65 UNCONTROLLED TYPE 2 DIABETES MELLITUS WITH HYPERGLYCEMIA (HCC): Primary | ICD-10-CM

## 2023-03-29 DIAGNOSIS — L73.9 FOLLICULITIS: ICD-10-CM

## 2023-03-29 DIAGNOSIS — E11.65 UNCONTROLLED TYPE 2 DIABETES MELLITUS WITH HYPERGLYCEMIA (HCC): ICD-10-CM

## 2023-03-29 PROCEDURE — 3078F DIAST BP <80 MM HG: CPT | Performed by: INTERNAL MEDICINE

## 2023-03-29 PROCEDURE — 95251 CONT GLUC MNTR ANALYSIS I&R: CPT | Performed by: INTERNAL MEDICINE

## 2023-03-29 PROCEDURE — 3052F HG A1C>EQUAL 8.0%<EQUAL 9.0%: CPT | Performed by: INTERNAL MEDICINE

## 2023-03-29 PROCEDURE — 3077F SYST BP >= 140 MM HG: CPT | Performed by: INTERNAL MEDICINE

## 2023-03-29 PROCEDURE — 99215 OFFICE O/P EST HI 40 MIN: CPT | Performed by: INTERNAL MEDICINE

## 2023-03-29 RX ORDER — BUPROPION HYDROCHLORIDE 75 MG/1
75 TABLET ORAL 2 TIMES DAILY
Qty: 60 TABLET | Refills: 5 | Status: SHIPPED | OUTPATIENT
Start: 2023-03-29

## 2023-03-29 RX ORDER — IBUPROFEN 800 MG/1
800 TABLET ORAL
COMMUNITY

## 2023-03-29 RX ORDER — DULAGLUTIDE 1.5 MG/.5ML
1.5 INJECTION, SOLUTION SUBCUTANEOUS
Qty: 6 ML | Refills: 2 | Status: SHIPPED | OUTPATIENT
Start: 2023-03-29

## 2023-03-29 RX ORDER — INSULIN ASPART 100 [IU]/ML
INJECTION, SOLUTION INTRAVENOUS; SUBCUTANEOUS
Qty: 30 ML | Refills: 6 | Status: SHIPPED | OUTPATIENT
Start: 2023-03-29

## 2023-03-29 RX ORDER — ACETAMINOPHEN 500 MG
TABLET ORAL
COMMUNITY

## 2023-03-29 RX ORDER — INSULIN GLARGINE 100 [IU]/ML
INJECTION, SOLUTION SUBCUTANEOUS
Qty: 30 ML | Refills: 6 | Status: SHIPPED | OUTPATIENT
Start: 2023-03-29

## 2023-03-29 NOTE — LETTER
3/29/2023    Patient: Telma Helm   YOB: 1960   Date of Visit: 3/29/2023     Curtis Ritter, [address] [place]  [address] [facility] [address]  Via Fax: [phone]     Yamil Means MD  [address] [place] [address]  Via Fax: [phone]    Dear Curtis Ritter, Romie Hdz MD,      Thank you for referring Ms. Wilber Yadav to [id number] [place] [address] for evaluation. My notes for this consultation are attached. If you have questions, please do not hesitate to call me. I look forward to following your patient along with you.       Sincerely,    Nancie Trinh MD

## 2023-03-29 NOTE — PROGRESS NOTES
[facility] DIABETES AND ENDOCRINOLOGY              Steve Dumont MD FACE       MS. Nico Ramírez IS  58 YEAR OLD PATIENT       HPI    Patient here for  follow up after last  visit for  Type 2 diabetes mellitus   From  dec video visit   2022     She has a forehead ulcer - being managed by derm ( she says  this was where she had punch biopsies done by surgeon  )   Pt is having to apply steroid and mupirocin cream     Lost 1 lb     Dec 2022   Pt got admitted with  4 disc prolapses   She  has the forehead ulcer  going on. .   Will be seeing new [facility] orthopedics  She is waiting to  see neurosurgeon now as she has sciatica   She has stopped jardiance   She does not have trulicity   because of COST ( waiting for Social security approval  )     Aug 12 2021     Referred : by self/pcp  She says she has waited for 1 year to get an appointment to see us  H/o diabetes for 7   years  2014  Current A1C is over 12  % and symptoms/problems include  SKIN LESION   SHE HAD a1c s in the past  Ar   8 % -   SO SHE IS PERPLEXED ALSO WITH SUDDEN LOSS OF CONTROL    She says this sudden disturbance, she had epidural shots and that disturbed her diabetes   She has folliculitis  -  Going on for 2 years   She saw dermatology quite often  And it is not  Getting better   She says these lesions are PAINFUL AND TRYING EVERYTHING TO HELP HER SKIN LESIONS   SHE RECEIVED ANTIBIOTIC  A MONTH AGO   Current diabetic medications include LANTUS  60  UNITS  AND GLIPIZIDE 5 MG  XR .   JANUMET WAS TRIED IN HIGHER DOSES BUT HAD TO LOWER THE GILLESPIE FOR BOWEL INCONTINENCE TOO   BUT SHE WAS ALSO ON STEROIDS AND MANY          Review of Systems   She has pain on her fore head ulcer     Physical Exam   Constitutional: She is oriented to person, place, and time. She appears well-developed and well-nourished. Forehead  ulcer - still open  and  not healing   Psychiatric: She has a normal mood and affect.          Lab Results   Component Value Date/Time    Hemoglobin A1c 8.9 (H) 03/23/2023 12:16 PM    Hemoglobin A1c 9.2 (H) 12/09/2022 03:10 PM    Hemoglobin A1c 8.0 (H) 05/27/2022 04:32 PM    Hemoglobin A1c, External 11.4 05/20/2021 12:00 AM    Glucose 153 (H) 03/23/2023 12:16 PM    Glucose (POC) 321 (H) 05/17/2021 04:47 PM    Microalb/Creat ratio (ug/mg creat.) 13 03/23/2023 12:16 PM    LDL, calculated 121 (H) 03/23/2023 12:16 PM    LDL, calculated 102 (H) 02/20/2020 11:34 AM    Creatinine 0.68 03/23/2023 12:16 PM      Lab Results   Component Value Date/Time    Cholesterol, total 182 03/23/2023 12:16 PM    HDL Cholesterol 42 03/23/2023 12:16 PM    LDL, calculated 121 (H) 03/23/2023 12:16 PM    LDL, calculated 102 (H) 02/20/2020 11:34 AM    Triglyceride 106 03/23/2023 12:16 PM     Lab Results   Component Value Date/Time    ALT (SGPT) 24 03/23/2023 12:16 PM    Alk. phosphatase 104 03/23/2023 12:16 PM    Bilirubin, direct 0.18 06/23/2014 04:57 PM    Bilirubin, total 0.3 03/23/2023 12:16 PM    Albumin 4.4 03/23/2023 12:16 PM    Protein, total 7.1 03/23/2023 12:16 PM    Ammonia, plasma 23 09/23/2019 10:58 PM    PLATELET 999 93/19/2669 06:50 PM       Lab Results   Component Value Date/Time    GFR est non-AA 96 12/30/2021 01:49 PM    GFR est  12/30/2021 01:49 PM    Creatinine 0.68 03/23/2023 12:16 PM    BUN 15 03/23/2023 12:16 PM    Sodium 142 03/23/2023 12:16 PM    Potassium 4.4 03/23/2023 12:16 PM    Chloride 104 03/23/2023 12:16 PM    CO2 21 03/23/2023 12:16 PM    Magnesium 1.8 03/27/2021 11:15 PM    Phosphorus 4.1 12/21/2016 11:11 AM     Lab Results   Component Value Date/Time    TSH 1.89 09/23/2019 09:26 PM              Assessment and Plan :    1.  Type 2 DM un controlled : A1c is  8.9 %   from  march 2023  compared to   9.2 %    from   dec 2022   compared to  7.9 %   from    august 2022 by POC   comapred to   8%   from    May 2022   compared to    7.8 %   From march 3 2022  Compared to    8.8 %    From    Jan 2022    Compared to    7.7 %    From  Today by  POC    Compared to 11.4% from May 2021 compared to 9.8% from March 14, 2021 March 2023     Not good glycemic control ( dietary non compliance, steroid use for ulcer ;  infection on forehead )     Stay on basal bolus regimen     BUT she is saying she is Depressed, and I am noticing she misses insulins   and  she is only doing sliding scales ( she claims not eating for 2- 3 days ) So, I increased sliding scale doses     Prescribed trulicity - she tried it but not consistent   Stopped metformin for diarrhea     Reviewed   Libreview AGP  report for 14 days and discussed with pt    - 14 day average glucose  234  - GMI   8.9 %   - 48 % for high ; 35 %  very high   and   0 % low sugars and % in Target  Range  17 %    - percent of utiization 83 %  - CV% 25.7%      Dec 2022    More loss of control - from hospitalization and  being on steroid shots   she is on basal bolus regimen   Could nto afford trulicity   Could nto take jardiance  -  because of yeast infections   Cannot take metformin  because of loose stools     Reviewed   Libreview AGP  report for 14 days and discussed with pt    - 14 day average glucose   -GMI     - 31% for high ; 8 %  very high   and   0 % low sugars and % in Target  Range  61%    - percent of utiization 98.5 %  - CV% 29.7%      August 2022     Lost control - from hospitalization and  being on steroid shots   For the past 15 days, she is doing well   STOP GLIPIZIDE as she is on basal bolus regimen   Reviewed   Libreview AGP  report for 14 days and discussed with pt    - 14 day average glucose 172  -GMI  7.4 %   - 31% for high ; 8 %  very high   and   0 % low sugars and % in Target  Range  61%    - percent of utiization 98.5 %  - CV% 29.7%      2. Hypoglycemia :  Educated on treating the hypoglycemia. 3. HTN : well controlled, On amlodipine and metoprolol xl ( HCTZ is being held as I am starting jardiance )    4. Dyslipidemia : Lipids are  High,   doubt  compliance on lipitor 20 mg at hs     5.   Diabetic complications :     A. Retinopathy-goes to [facility] ,  regular f/u with ophthalmologist encouraged    B. Nephropathy - NO       C. CAD - has some form of arrythmia        7. Multiple abscesses :  forehead area  - a catch 22 likely with her picking on it  but it has NOT healed  for over an year   Seeing derm   -  On Neurontin and Cymbalta        8. Fatty liver  Disease : stable       9. She has disc issues to address - she will be seeing ortho , [facility]   1. Lumbar pain   2. Spondylolisthesis, lumbar region   3. Spinal stenosis of lumbar region with neurogenic claudication       10. Depression - starting on wellbutrin  75 mg bid       As she lost control on DM,  Anxious too  . .. discussed med actions, pros and cons of each choice  And  Discussed  changes and spent more than 25 min in interpretation and counseling     Total time  : 42 min         Reviewed results with patient and discussed the labs being ordered today/bnv  Patient voiced understanding of plan of care

## 2023-03-29 NOTE — PROGRESS NOTES
Zohra Kelly is a 58 y.o. female here for   Chief Complaint   Patient presents with    Diabetes       1. Have you been to the ER, urgent care clinic since your last visit? Hospitalized since your last visit? -    2. Have you seen or consulted any other health care providers outside of the [address] [place] [address] since your last visit?   Include any pap smears or colon screening.-

## 2023-03-29 NOTE — PATIENT INSTRUCTIONS
SPECIFIC INSTRUCTIONS BELOW       DRINK water   Do not skip meals  Do not eat in between meals    Reduce carbs- pasta, rice, potatoes, bread   Try to avoid processed bread products like BISCUITS, CROISSANTS, MUFFINS    Do not drink juices or sodas, even if they are calorie zero or diet drinks and especially avoid using powders like crystalloids , FLORENCE-AIDS     Do not eat peanut butter     Do not eat sugar free cookies and cakes   Do not eat peaches, oranges, pineapples, raisins, grapes , canteloupe , honey dew, mangoes , watermelon  and fruit medleys        --------------------------------------------------------------------------------------------------------------      TRULICITY  1.5   mg a week        Check blood sugars immediately before each meal       Take  LANTUS  insulin  70 units  at bed time     Take NOVOLOG     Insulin  8    UNITS BEFORE EACH MEAL     Also, add additional NOVOLOG  as follows with meals  If blood sugars are[de-identified]    150-200 mg 2 units    201-250 mg 5  units    251-300 mg 8 units    301-350 mg 11 units    351-400 mg 14 units    401-450 mg 17  units    451-500 mg 20 units     Less than 70 mg NO INSULIN        -------------PAY ATTENTION TO THESE GENERAL INSTRUCTIONS -----------------      - The medications prescribed at this visit will not be available at pharmacy until 6 pm       - YOUR MED LIST IS NOT UP TO DATE AS SOME CHANGES ARE BEING MADE AFTER THE VISIT - FOLLOW SPECIFIC INSTRUCTIONS  ABOVE     -ANY tests other than blood work, which you opt to do  outside the  [facility] imaging facilities, you are responsible for prior authorizations if  required    - [address] [facility]- PLEASE IGNORE     Results     *Normal results will not be notified by a phone call starting January 1 2021   *If you have an upcoming visit, the results will be discussed at the visit   *Please sign up for MY CHART if you want access to your lab and test results  *Abnormal results which require immediate attention will be notified by phone call   *Abnormal results which do not require immediate assistance will be notified in 1-2 weeks       Refills    -    have your pharmacy send us a refill request . Refills are done max for one year and a visit is a must before refills are extended    Follow up appointments -  highly encourage you to make it when you are checking out. We can accommodate you into the schedule based on your clinical situation, but not for extending refills beyond a year. Labs are important to give refills and is important to get labs before the visit     Phone calls  -  Allow  24 hrs.  for non-urgent calls to be returned  Prior authorization - It may take 2-4 weeks to process  Forms  -  FMLA, DMV etc., will take up to 2 weeks to process  Cancellations - please notify the office 2 days in advance   Samples  - will only be dispensed at visits       If not showing for the appointments and cancelling appointments within 24 hours are kept track of and three  of such situations in  two consecutive years will likely be considered for termination from the practice    -------------------------------------------------------------------------------------------------------------------

## 2023-04-07 ENCOUNTER — TRANSCRIBE ORDER (OUTPATIENT)
Dept: MAMMOGRAPHY | Age: 63
End: 2023-04-07

## 2023-04-23 DIAGNOSIS — Z12.31 VISIT FOR SCREENING MAMMOGRAM: Primary | ICD-10-CM

## 2023-04-28 RX ORDER — BUPROPION HYDROCHLORIDE 75 MG/1
75 TABLET ORAL 2 TIMES DAILY
Qty: 180 TABLET | Refills: 1 | Status: SHIPPED | OUTPATIENT
Start: 2023-04-28

## 2023-07-31 DIAGNOSIS — Z79.4 TYPE 2 DIABETES MELLITUS WITH HYPERGLYCEMIA, WITH LONG-TERM CURRENT USE OF INSULIN (HCC): Primary | ICD-10-CM

## 2023-07-31 DIAGNOSIS — Z79.4 LONG TERM (CURRENT) USE OF INSULIN (HCC): ICD-10-CM

## 2023-07-31 DIAGNOSIS — E11.65 TYPE 2 DIABETES MELLITUS WITH HYPERGLYCEMIA, WITH LONG-TERM CURRENT USE OF INSULIN (HCC): Primary | ICD-10-CM

## 2023-08-02 LAB
ALBUMIN SERPL-MCNC: 4.2 G/DL (ref 3.9–4.9)
ALBUMIN/CREAT UR: 8 MG/G CREAT (ref 0–29)
ALBUMIN/GLOB SERPL: 1.4 {RATIO} (ref 1.2–2.2)
ALP SERPL-CCNC: 101 IU/L (ref 44–121)
ALT SERPL-CCNC: 21 IU/L (ref 0–32)
AST SERPL-CCNC: 29 IU/L (ref 0–40)
BILIRUB SERPL-MCNC: 0.4 MG/DL (ref 0–1.2)
BUN SERPL-MCNC: 14 MG/DL (ref 8–27)
BUN/CREAT SERPL: 15 (ref 12–28)
CALCIUM SERPL-MCNC: 9.9 MG/DL (ref 8.7–10.3)
CHLORIDE SERPL-SCNC: 102 MMOL/L (ref 96–106)
CHOLEST SERPL-MCNC: 201 MG/DL (ref 100–199)
CO2 SERPL-SCNC: 23 MMOL/L (ref 20–29)
CREAT SERPL-MCNC: 0.94 MG/DL (ref 0.57–1)
CREAT UR-MCNC: 364.9 MG/DL
EGFRCR SERPLBLD CKD-EPI 2021: 69 ML/MIN/1.73
GLOBULIN SER CALC-MCNC: 3 G/DL (ref 1.5–4.5)
GLUCOSE SERPL-MCNC: 124 MG/DL (ref 70–99)
HBA1C MFR BLD: 8.3 % (ref 4.8–5.6)
HDLC SERPL-MCNC: 38 MG/DL
IMP & REVIEW OF LAB RESULTS: NORMAL
LDLC SERPL CALC-MCNC: 140 MG/DL (ref 0–99)
MICROALBUMIN UR-MCNC: 27.4 UG/ML
POTASSIUM SERPL-SCNC: 4.2 MMOL/L (ref 3.5–5.2)
PROT SERPL-MCNC: 7.2 G/DL (ref 6–8.5)
SODIUM SERPL-SCNC: 141 MMOL/L (ref 134–144)
TRIGL SERPL-MCNC: 129 MG/DL (ref 0–149)
VLDLC SERPL CALC-MCNC: 23 MG/DL (ref 5–40)

## 2023-08-17 ENCOUNTER — TELEPHONE (OUTPATIENT)
Age: 63
End: 2023-08-17

## 2023-08-17 NOTE — TELEPHONE ENCOUNTER
Patient called stated she needs some help with the pain and her insulin. She was instructed to call this am to get an appt. But she can not wait until Nov. 2023. Please call.  Thank you

## 2023-08-18 ENCOUNTER — OFFICE VISIT (OUTPATIENT)
Age: 63
End: 2023-08-18

## 2023-08-18 VITALS
HEART RATE: 88 BPM | DIASTOLIC BLOOD PRESSURE: 90 MMHG | RESPIRATION RATE: 20 BRPM | HEIGHT: 63 IN | TEMPERATURE: 96.9 F | SYSTOLIC BLOOD PRESSURE: 150 MMHG | WEIGHT: 227 LBS | BODY MASS INDEX: 40.22 KG/M2 | OXYGEN SATURATION: 98 %

## 2023-08-18 DIAGNOSIS — E11.65 TYPE 2 DIABETES MELLITUS WITH HYPERGLYCEMIA, WITH LONG-TERM CURRENT USE OF INSULIN (HCC): Primary | ICD-10-CM

## 2023-08-18 DIAGNOSIS — L73.9 FOLLICULAR DISORDER, UNSPECIFIED: ICD-10-CM

## 2023-08-18 DIAGNOSIS — R21 RASH AND OTHER NONSPECIFIC SKIN ERUPTION: ICD-10-CM

## 2023-08-18 DIAGNOSIS — Z79.4 TYPE 2 DIABETES MELLITUS WITH HYPERGLYCEMIA, WITH LONG-TERM CURRENT USE OF INSULIN (HCC): Primary | ICD-10-CM

## 2023-08-18 DIAGNOSIS — Z79.4 LONG TERM (CURRENT) USE OF INSULIN (HCC): ICD-10-CM

## 2023-08-18 RX ORDER — CELECOXIB AND TRAMADOL HYDROCHLORIDE 56; 44 MG/1; MG/1
TABLET ORAL
COMMUNITY
Start: 2023-07-25

## 2023-08-18 RX ORDER — DULOXETIN HYDROCHLORIDE 60 MG/1
CAPSULE, DELAYED RELEASE ORAL
Qty: 90 CAPSULE | Refills: 0 | Status: SHIPPED | OUTPATIENT
Start: 2023-08-18

## 2023-08-18 RX ORDER — ATORVASTATIN CALCIUM 20 MG/1
TABLET, FILM COATED ORAL
Qty: 90 TABLET | Refills: 3 | Status: SHIPPED | OUTPATIENT
Start: 2023-08-18

## 2023-08-18 RX ORDER — DULAGLUTIDE 1.5 MG/.5ML
INJECTION, SOLUTION SUBCUTANEOUS
Qty: 6 ML | Refills: 3 | Status: SHIPPED | OUTPATIENT
Start: 2023-08-18

## 2023-08-18 NOTE — PROGRESS NOTES
[facility] DIABETES AND ENDOCRINOLOGY                Janet Brown MD FACE          MS. Mi Mcclellan IS  58 YEAR OLD PATIENT          HPI      Patient here for  follow up after last  visit for  Type 2 diabetes mellitus   From  March 2023       She is dealing with lot of pain in her body   She is under PT care   Got nerve block   by  Dr. Neha Chauhan , pain is betteer         March 2023      She has a forehead ulcer - being managed by derm ( she says  this was where she had punch biopsies done by surgeon  )    Pt is having to apply steroid and mupirocin cream     Lost 1 lb       Dec 2022    Pt got admitted with  4 disc prolapses    She  has the forehead ulcer  going on. .    [facility]   She is waiting to  see neurosurgeon now as she has sciatica    She has stopped jardiance    She does not have trulicity   because of COST ( waiting for Social security approval  )       Aug 12 2021       Referred : by self/pcp   She says she has waited for 1 year to get an appointment to see us   H/o diabetes for 7   years  2014   Current A1C is over 12  % and symptoms/problems include  SKIN LESION    SHE HAD a1c s in the past  Ar   8 % -    SO SHE IS PERPLEXED ALSO WITH SUDDEN LOSS OF CONTROL     She says this sudden disturbance, she had epidural shots and that disturbed her diabetes    She has folliculitis  -  Going on for 2 years    She saw dermatology quite often  And it is not  Getting better    She says these lesions are PAINFUL AND TRYING EVERYTHING TO HELP HER SKIN LESIONS    SHE RECEIVED ANTIBIOTIC  A MONTH AGO    Current diabetic medications include LANTUS  60  UNITS  AND GLIPIZIDE 5 MG  XR .    JANUMET WAS TRIED IN HIGHER DOSES BUT HAD TO LOWER THE LOYA FOR BOWEL INCONTINENCE TOO    BUT SHE WAS ALSO ON STEROIDS AND MANY              Review of Systems    She has pain on her fore head ulcer       Physical Exam    Constitutional: She is oriented to person, place, and time.  She appears

## 2023-08-18 NOTE — PATIENT INSTRUCTIONS
SPECIFIC INSTRUCTIONS BELOW     DRINK water   Do not skip meals  Do not eat in between meals     Reduce carbs- pasta, rice, potatoes, bread   Try to avoid processed bread products like BISCUITS, CROISSANTS, MUFFINS     Do not drink juices or sodas, even if they are calorie zero or diet drinks and especially avoid using powders like crystalloids , ASAF-AIDS      Do not eat peanut butter      Do not eat sugar free cookies and cakes   Do not eat peaches, oranges, pineapples, raisins, grapes , canteloupe , honey dew, mangoes , watermelon  and fruit medleys           --------------------------------------------------------------------------------------------------------------        TRULICITY  1.5   mg a week          Check blood sugars immediately before each meal         Take  LANTUS  insulin  70 units  at bed time      Take NOVOLOG     Insulin  8    UNITS BEFORE EACH MEAL      Also, add additional NOVOLOG  as follows with meals  If blood sugars are[de-identified]     150-200 mg 2 units     201-250 mg 5  units     251-300 mg 8 units     301-350 mg 11 units     351-400 mg 14 units     401-450 mg 17  units     451-500 mg 20 units     Less than 70 mg NO INSULIN           -------------PAY ATTENTION TO THESE GENERAL INSTRUCTIONS -----------------      - The medications prescribed at this visit will not be available at pharmacy until 6 pm       - YOUR MED LIST IS NOT UP TO DATE AS SOME CHANGES ARE BEING MADE AFTER THE VISIT - FOLLOW SPECIFIC INSTRUCTIONS  ABOVE     -ANY tests other than blood work, which you opt to do  outside the  [facility] imaging facilities, you are responsible for prior authorizations if  required    - [address] [place] [address] [facility]- PLEASE IGNORE     Results     *Normal results will not be notified by a phone call starting January 1 2021   *If you have an upcoming visit, the results will be discussed at the visit   *Please sign up for MY CHART if you want access to your lab and test

## 2024-01-17 DIAGNOSIS — E11.65 TYPE 2 DIABETES MELLITUS WITH HYPERGLYCEMIA, WITH LONG-TERM CURRENT USE OF INSULIN (HCC): Primary | ICD-10-CM

## 2024-01-17 DIAGNOSIS — K75.81 NASH (NONALCOHOLIC STEATOHEPATITIS): ICD-10-CM

## 2024-01-17 DIAGNOSIS — Z79.4 TYPE 2 DIABETES MELLITUS WITH HYPERGLYCEMIA, WITH LONG-TERM CURRENT USE OF INSULIN (HCC): Primary | ICD-10-CM

## 2024-01-18 LAB
ALBUMIN SERPL-MCNC: 4.2 G/DL (ref 3.9–4.9)
ALBUMIN/CREAT UR: 16 MG/G CREAT (ref 0–29)
ALBUMIN/GLOB SERPL: 1.4 {RATIO} (ref 1.2–2.2)
ALP SERPL-CCNC: 96 IU/L (ref 44–121)
ALT SERPL-CCNC: 21 IU/L (ref 0–32)
AST SERPL-CCNC: 17 IU/L (ref 0–40)
BILIRUB SERPL-MCNC: 0.2 MG/DL (ref 0–1.2)
BUN SERPL-MCNC: 14 MG/DL (ref 8–27)
BUN/CREAT SERPL: 19 (ref 12–28)
CALCIUM SERPL-MCNC: 9.7 MG/DL (ref 8.7–10.3)
CHLORIDE SERPL-SCNC: 103 MMOL/L (ref 96–106)
CHOLEST SERPL-MCNC: 190 MG/DL (ref 100–199)
CO2 SERPL-SCNC: 22 MMOL/L (ref 20–29)
CREAT SERPL-MCNC: 0.75 MG/DL (ref 0.57–1)
CREAT UR-MCNC: 151.8 MG/DL
EGFRCR SERPLBLD CKD-EPI 2021: 89 ML/MIN/1.73
GLOBULIN SER CALC-MCNC: 3 G/DL (ref 1.5–4.5)
GLUCOSE SERPL-MCNC: 230 MG/DL (ref 70–99)
HBA1C MFR BLD: 7.6 % (ref 4.8–5.6)
HDLC SERPL-MCNC: 45 MG/DL
IMP & REVIEW OF LAB RESULTS: NORMAL
LDLC SERPL CALC-MCNC: 111 MG/DL (ref 0–99)
Lab: NORMAL
MICROALBUMIN UR-MCNC: 25 UG/ML
POTASSIUM SERPL-SCNC: 3.9 MMOL/L (ref 3.5–5.2)
PROT SERPL-MCNC: 7.2 G/DL (ref 6–8.5)
SODIUM SERPL-SCNC: 142 MMOL/L (ref 134–144)
TRIGL SERPL-MCNC: 193 MG/DL (ref 0–149)
VLDLC SERPL CALC-MCNC: 34 MG/DL (ref 5–40)

## 2024-01-19 ENCOUNTER — OFFICE VISIT (OUTPATIENT)
Age: 64
End: 2024-01-19
Payer: MEDICARE

## 2024-01-19 VITALS
HEIGHT: 63 IN | RESPIRATION RATE: 20 BRPM | BODY MASS INDEX: 40.04 KG/M2 | OXYGEN SATURATION: 98 % | WEIGHT: 226 LBS | HEART RATE: 94 BPM | DIASTOLIC BLOOD PRESSURE: 80 MMHG | SYSTOLIC BLOOD PRESSURE: 130 MMHG

## 2024-01-19 DIAGNOSIS — Z79.4 TYPE 2 DIABETES MELLITUS WITH HYPERGLYCEMIA, WITH LONG-TERM CURRENT USE OF INSULIN (HCC): Primary | ICD-10-CM

## 2024-01-19 DIAGNOSIS — E11.65 TYPE 2 DIABETES MELLITUS WITH HYPERGLYCEMIA, WITH LONG-TERM CURRENT USE OF INSULIN (HCC): Primary | ICD-10-CM

## 2024-01-19 DIAGNOSIS — Z79.4 LONG TERM (CURRENT) USE OF INSULIN (HCC): ICD-10-CM

## 2024-01-19 DIAGNOSIS — E78.2 MIXED HYPERLIPIDEMIA: ICD-10-CM

## 2024-01-19 PROCEDURE — 3051F HG A1C>EQUAL 7.0%<8.0%: CPT | Performed by: INTERNAL MEDICINE

## 2024-01-19 PROCEDURE — 3079F DIAST BP 80-89 MM HG: CPT | Performed by: INTERNAL MEDICINE

## 2024-01-19 PROCEDURE — 1036F TOBACCO NON-USER: CPT | Performed by: INTERNAL MEDICINE

## 2024-01-19 PROCEDURE — 2022F DILAT RTA XM EVC RTNOPTHY: CPT | Performed by: INTERNAL MEDICINE

## 2024-01-19 PROCEDURE — G8427 DOCREV CUR MEDS BY ELIG CLIN: HCPCS | Performed by: INTERNAL MEDICINE

## 2024-01-19 PROCEDURE — 3075F SYST BP GE 130 - 139MM HG: CPT | Performed by: INTERNAL MEDICINE

## 2024-01-19 PROCEDURE — 99214 OFFICE O/P EST MOD 30 MIN: CPT | Performed by: INTERNAL MEDICINE

## 2024-01-19 PROCEDURE — G8484 FLU IMMUNIZE NO ADMIN: HCPCS | Performed by: INTERNAL MEDICINE

## 2024-01-19 PROCEDURE — 3017F COLORECTAL CA SCREEN DOC REV: CPT | Performed by: INTERNAL MEDICINE

## 2024-01-19 PROCEDURE — G8417 CALC BMI ABV UP PARAM F/U: HCPCS | Performed by: INTERNAL MEDICINE

## 2024-01-19 RX ORDER — PRAVASTATIN SODIUM 40 MG
40 TABLET ORAL DAILY
Qty: 90 TABLET | Refills: 1 | Status: SHIPPED | OUTPATIENT
Start: 2024-01-19

## 2024-01-19 RX ORDER — DULAGLUTIDE 1.5 MG/.5ML
INJECTION, SOLUTION SUBCUTANEOUS
Qty: 6 ML | Refills: 3 | Status: SHIPPED | OUTPATIENT
Start: 2024-01-19

## 2024-01-19 RX ORDER — INSULIN GLARGINE 100 [IU]/ML
INJECTION, SOLUTION SUBCUTANEOUS
Qty: 60 ML | Refills: 3 | Status: SHIPPED | OUTPATIENT
Start: 2024-01-19

## 2024-01-19 NOTE — PATIENT INSTRUCTIONS
---------------------------------------------------------------------------------------------------------------------------------------------                   -------------PAY ATTENTION TO THESE GENERAL INSTRUCTIONS -----------------      - The medications prescribed at this visit will not be available at pharmacy until 6 pm       - YOUR MED LIST IS NOT UP TO DATE AS SOME CHANGES ARE BEING MADE AFTER THE VISIT - FOLLOW SPECIFIC INSTRUCTIONS  ABOVE     -ANY tests other than blood work, which you opt to do  outside the  [facility] facilities, you are responsible for prior authorizations if  required    - HEALTH MAINTENANCE IS NOT GOING TO BE UP TO DATE ON YOUR AVS- PLEASE IGNORE     Results     *Normal results will not be notified by a phone call starting January 1 2021   *If you have an upcoming visit, the results will be discussed at the visit   *Please sign up for MY CHART if you want access to your lab and test results  *Abnormal results which require immediate attention will be notified by phone call   *Abnormal results which do not require immediate assistance will be notified in 1-2 weeks       Refills    -    have your pharmacy send us a refill request . Refills are done max for one year and a visit is a must before refills are extended    Follow up appointments -  highly encourage you to make it when you are checking out. We can accommodate you into the schedule based on your clinical situation, but not for extending refills beyond a year. Labs are important to give refills and is important to get labs before the visit     Phone calls  -  Allow  24 hrs. for non-urgent calls to be returned  Prior authorization - It may take 2-4 weeks to process  Forms  -  FMLA, DMV etc., will take up to 2 weeks to process  Cancellations - please notify the office 2 days in advance   Samples  - will only be dispensed at visits       If not showing for the appointments and cancelling appointments within 24 hours

## 2024-01-19 NOTE — PROGRESS NOTES
Blood pressure 130/80, pulse 94, resp. rate 20, height 1.6 m (5' 3\"), weight 102.5 kg (226 lb), SpO2 98 %.   
DOSES BUT HAD TO LOWER THE LOYA FOR BOWEL INCONTINENCE TOO    BUT SHE WAS ALSO ON STEROIDS AND MANY              Review of Systems    She has back pain       Physical Exam    Constitutional: She is oriented to person, place, and time. She appears well-developed and well-nourished.   Using cane    Psychiatric: She has a normal mood and affect.             Labs    Diabetes    Lab Results   Component Value Date    LABA1C 7.6 (H) 01/17/2024    LABA1C 8.3 (H) 08/01/2023    LABA1C 8.9 (H) 03/23/2023       Lab Results   Component Value Date     01/17/2024    K 3.9 01/17/2024     01/17/2024    CO2 22 01/17/2024    BUN 14 01/17/2024    CREATININE 0.75 01/17/2024    GLUCOSE 230 (H) 01/17/2024    CALCIUM 9.7 01/17/2024    PROT 7.2 01/17/2024    LABALBU 25.0 01/17/2024    LABALBU 4.2 01/17/2024    BILITOT 0.2 01/17/2024    ALKPHOS 96 01/17/2024    AST 17 01/17/2024    ALT 21 01/17/2024    LABGLOM 89 01/17/2024    GFRAA 111 12/30/2021    AGRATIO 1.4 01/17/2024    GLOB 3.8 05/17/2021    CHOL 190 01/17/2024    TRIG 193 (H) 01/17/2024    LDLCALC 111 (H) 01/17/2024    HDL 45 01/17/2024        Lab Results   Component Value Date    MALBCR 16 01/17/2024                Assessment and Plan :      1. Type 2 DM un controlled : A1c is  7.6 %      from  jan 2024    compared to      8.3 %    from    august 2023      compared to   8.9 %   from  march 2023  compared to   9.2 %     from   dec 2022   compared to  7.9 %   from    august 2022 by POC   comapred to   8%   from    May 2022   compared to    7.8 %   From  march 3 2022  Compared to    8.8 %    From    Jan 2022     Compared to    7.7 %    From  Today by  POC    Compared to   11.4% from May 2021 compared to 9.8% from March 14, 2021 Jan 2024     Some more  improvement    of     glycemic control        Stay on basal bolus regimen    Explained  on discipline and  behavior change and use insulin     Prescribed trulicity - she tried it but not consistent    Stopped metformin

## 2024-02-15 ENCOUNTER — TELEPHONE (OUTPATIENT)
Age: 64
End: 2024-02-15

## 2024-02-15 NOTE — TELEPHONE ENCOUNTER
That is right   Trulicity is available without PA      She has to ask pharmacy to fill it for her   Ofcourse, if they say no - she has to ask is this because of lack of availability ?

## 2024-02-15 NOTE — TELEPHONE ENCOUNTER
[facility] stated that [facility] on [facility] is advising her Trulicity requires pa see in specialty care documentation for 01/17 stating it does not please look into this and contact patient

## 2024-02-16 NOTE — TELEPHONE ENCOUNTER
Unable to contact on mobile not able to leave message left message at home number on file. PA should not be required if something is needed have pharmacy contact office or initiate pa request. Check with pharmacy to make sure its not that the medication is not available and that is the issue. Patient advised to contact us back once she gathers information she needs to share or will start pa if this is in fact what is needed.

## 2024-03-05 ENCOUNTER — HOSPITAL ENCOUNTER (OUTPATIENT)
Facility: HOSPITAL | Age: 64
Discharge: HOME | End: 2024-03-08
Payer: MEDICARE

## 2024-03-05 DIAGNOSIS — M51.26 OTHER INTERVERTEBRAL DISC DISPLACEMENT, LUMBAR REGION: ICD-10-CM

## 2024-03-05 PROCEDURE — 72148 MRI LUMBAR SPINE W/O DYE: CPT

## 2024-03-09 DIAGNOSIS — E11.65 TYPE 2 DIABETES MELLITUS WITH HYPERGLYCEMIA, WITH LONG-TERM CURRENT USE OF INSULIN (HCC): ICD-10-CM

## 2024-03-09 DIAGNOSIS — Z79.4 TYPE 2 DIABETES MELLITUS WITH HYPERGLYCEMIA, WITH LONG-TERM CURRENT USE OF INSULIN (HCC): ICD-10-CM

## 2024-03-09 RX ORDER — DULAGLUTIDE 1.5 MG/.5ML
INJECTION, SOLUTION SUBCUTANEOUS
Qty: 6 ML | Refills: 3 | Status: SHIPPED | OUTPATIENT
Start: 2024-03-09

## 2024-03-11 ENCOUNTER — HOSPITAL ENCOUNTER (OUTPATIENT)
Facility: HOSPITAL | Age: 64
Discharge: HOME | End: 2024-03-14
Payer: MEDICARE

## 2024-03-11 DIAGNOSIS — Z12.31 VISIT FOR SCREENING MAMMOGRAM: ICD-10-CM

## 2024-03-11 PROCEDURE — 77067 SCR MAMMO BI INCL CAD: CPT

## 2024-03-12 ENCOUNTER — TELEPHONE (OUTPATIENT)
Age: 64
End: 2024-03-12

## 2024-03-12 RX ORDER — DULOXETIN HYDROCHLORIDE 60 MG/1
CAPSULE, DELAYED RELEASE ORAL
Qty: 90 CAPSULE | Refills: 0 | Status: SHIPPED | OUTPATIENT
Start: 2024-03-12

## 2024-03-12 NOTE — TELEPHONE ENCOUNTER
Pt stated she could not get trulicity for last 3 months and its on back order . She also don't have any side effects . Pt would like another medication .

## 2024-03-12 NOTE — TELEPHONE ENCOUNTER
Pt can't get bs off her dexcom and not set up to the office . I also advised her to bring it by . I also gave her Dexcom number as well to help her .

## 2024-03-12 NOTE — TELEPHONE ENCOUNTER
I called the pt and she have no side effects for Cymbalta . Pt stated she have been taking it for years .

## 2024-03-15 DIAGNOSIS — E11.65 TYPE 2 DIABETES MELLITUS WITH HYPERGLYCEMIA, WITH LONG-TERM CURRENT USE OF INSULIN (HCC): ICD-10-CM

## 2024-03-15 DIAGNOSIS — Z79.4 TYPE 2 DIABETES MELLITUS WITH HYPERGLYCEMIA, WITH LONG-TERM CURRENT USE OF INSULIN (HCC): ICD-10-CM

## 2024-03-15 RX ORDER — DULAGLUTIDE 0.75 MG/.5ML
0.75 INJECTION, SOLUTION SUBCUTANEOUS WEEKLY
Qty: 6 ML | Refills: 0 | Status: CANCELLED | OUTPATIENT
Start: 2024-03-15

## 2024-03-15 RX ORDER — DULAGLUTIDE 1.5 MG/.5ML
INJECTION, SOLUTION SUBCUTANEOUS
Qty: 6 ML | Refills: 3 | Status: CANCELLED | OUTPATIENT
Start: 2024-03-15

## 2024-03-15 RX ORDER — DULAGLUTIDE 3 MG/.5ML
3 INJECTION, SOLUTION SUBCUTANEOUS WEEKLY
Qty: 6 ML | Refills: 1 | Status: SHIPPED | OUTPATIENT
Start: 2024-03-15

## 2024-03-15 NOTE — TELEPHONE ENCOUNTER
Patient Comment: Please change my order to either the 0.75 mg or the 3 mg dose. They only got these in stock. Thanks.

## 2024-06-10 RX ORDER — DULOXETIN HYDROCHLORIDE 60 MG/1
CAPSULE, DELAYED RELEASE ORAL
Qty: 90 CAPSULE | Refills: 3 | Status: SHIPPED | OUTPATIENT
Start: 2024-06-10

## 2024-08-17 LAB — HBA1C MFR BLD HPLC: 9.9 %

## 2024-09-04 ENCOUNTER — OFFICE VISIT (OUTPATIENT)
Age: 64
End: 2024-09-04
Payer: MEDICARE

## 2024-09-04 VITALS
BODY MASS INDEX: 40.15 KG/M2 | HEART RATE: 101 BPM | OXYGEN SATURATION: 98 % | WEIGHT: 226.6 LBS | TEMPERATURE: 97.3 F | HEIGHT: 63 IN | SYSTOLIC BLOOD PRESSURE: 139 MMHG | DIASTOLIC BLOOD PRESSURE: 88 MMHG

## 2024-09-04 DIAGNOSIS — Z79.4 LONG TERM (CURRENT) USE OF INSULIN (HCC): ICD-10-CM

## 2024-09-04 DIAGNOSIS — E78.2 MIXED HYPERLIPIDEMIA: ICD-10-CM

## 2024-09-04 DIAGNOSIS — Z79.4 TYPE 2 DIABETES MELLITUS WITH HYPERGLYCEMIA, WITH LONG-TERM CURRENT USE OF INSULIN (HCC): ICD-10-CM

## 2024-09-04 DIAGNOSIS — E11.65 TYPE 2 DIABETES MELLITUS WITH HYPERGLYCEMIA, WITH LONG-TERM CURRENT USE OF INSULIN (HCC): ICD-10-CM

## 2024-09-04 LAB
CREAT UR-MCNC: 303 MG/DL
MICROALBUMIN UR-MCNC: 4.34 MG/DL
MICROALBUMIN/CREAT UR-RTO: 14 MG/G (ref 0–30)

## 2024-09-04 PROCEDURE — G8417 CALC BMI ABV UP PARAM F/U: HCPCS | Performed by: INTERNAL MEDICINE

## 2024-09-04 PROCEDURE — 1036F TOBACCO NON-USER: CPT | Performed by: INTERNAL MEDICINE

## 2024-09-04 PROCEDURE — 99215 OFFICE O/P EST HI 40 MIN: CPT | Performed by: INTERNAL MEDICINE

## 2024-09-04 PROCEDURE — 3051F HG A1C>EQUAL 7.0%<8.0%: CPT | Performed by: INTERNAL MEDICINE

## 2024-09-04 PROCEDURE — 2022F DILAT RTA XM EVC RTNOPTHY: CPT | Performed by: INTERNAL MEDICINE

## 2024-09-04 PROCEDURE — 3079F DIAST BP 80-89 MM HG: CPT | Performed by: INTERNAL MEDICINE

## 2024-09-04 PROCEDURE — 3075F SYST BP GE 130 - 139MM HG: CPT | Performed by: INTERNAL MEDICINE

## 2024-09-04 PROCEDURE — 3017F COLORECTAL CA SCREEN DOC REV: CPT | Performed by: INTERNAL MEDICINE

## 2024-09-04 PROCEDURE — G8427 DOCREV CUR MEDS BY ELIG CLIN: HCPCS | Performed by: INTERNAL MEDICINE

## 2024-09-04 RX ORDER — DULAGLUTIDE 3 MG/.5ML
3 INJECTION, SOLUTION SUBCUTANEOUS WEEKLY
Qty: 6 ML | Refills: 3 | Status: SHIPPED | OUTPATIENT
Start: 2024-09-04

## 2024-09-04 RX ORDER — EZETIMIBE 10 MG/1
TABLET ORAL
COMMUNITY
Start: 2024-08-23

## 2024-09-04 RX ORDER — INSULIN GLARGINE 100 [IU]/ML
INJECTION, SOLUTION SUBCUTANEOUS
Qty: 60 ML | Refills: 3 | Status: SHIPPED | OUTPATIENT
Start: 2024-09-04

## 2024-09-04 NOTE — PATIENT INSTRUCTIONS
SPECIFIC INSTRUCTIONS BELOW     DRINK water   Do not skip meals  Do not eat in between meals     Reduce carbs- pasta, rice, potatoes, bread   Try to avoid processed bread products like BISCUITS, CROISSANTS, MUFFINS     Do not drink juices or sodas, even if they are calorie zero or diet drinks and especially avoid using powders like crystalloids , ASAF-AIDS      Do not eat peanut butter      Do not eat sugar free cookies and cakes   Do not eat peaches, oranges, pineapples, raisins, grapes , canteloupe , honey dew, mangoes , watermelon  and fruit medleys           --------------------------------------------------------------------------------------------------------------        TRULICITY  3    mg a week          Check blood sugars immediately before each meal         Take  basaglar    insulin  70 units  at bed time      Take NOVOLOG     Insulin   5     UNITS BEFORE EACH MEAL      Also, add additional NOVOLOG  as follows with meals  If blood sugars are::     151-200 mg 2 units     201-250 mg 5  units     251-300 mg 8 units     301-350 mg 11 units     351-400 mg 14 units     401-450 mg 17  units     451-500 mg 20 units     Less than 70 mg NO INSULIN    ---------------------------------------------------------------------------------------------------------------      Follow the steps  to get started on  DEXCOM / freestyle Phoebe 2  for Medicare patients       Keep a log of blood sugars by checking 4 times a day    you must be on insulin shots at least 3 times a day   3.  Contact one of the following  DME suppliers  and find out who accepts your plan   4. Give that supplier  our  office info  and fax number       [employer]  is best  for  freestyle PHOEBE 2 - [phone]      [facility]  [phone]      [facility] [facility]   [phone]

## 2024-09-04 NOTE — PROGRESS NOTES
[facility] END[facility]                Lori Ladd MD FACE          MS. REYES   is      63  YEAR OLD PATIENT          HPI      Patient here for  follow up after last  visit for  Type 2 diabetes mellitus   From  jan 2024      Pt  is able to walk without cane, she went thru PT   She says no reason was found for her  inability to walk    She stayed busy with her son's father , who is dialysis dependant     She has a new PCP   She has the ulcer on her forehead   She is not wanting to go for surgery   Pcp stopped statin,  for myalgias   and got  zetia         Jan 2024     She looks happier  ( for first time )   She continues to have  skin trouble  -   saw a surgeon on forehead  ulcer   She feels like she got control on her diet   Lost 1 lb   She has back issues - got epidurals and she finds her LE still weak         Aug 12 2021       Referred : by self/pcp   She says she has waited for 1 year to get an appointment to see us   H/o diabetes for 7   years  2014   Current A1C is over 12  % and symptoms/problems include  SKIN LESION    SHE HAD a1c s in the past  Ar   8 % -    SO SHE IS PERPLEXED ALSO WITH SUDDEN LOSS OF CONTROL     She says this sudden disturbance, she had epidural shots and that disturbed her diabetes    She has folliculitis  -  Going on for 2 years    She saw dermatology quite often  And it is not  Getting better    She says these lesions are PAINFUL AND TRYING EVERYTHING TO HELP HER SKIN LESIONS    SHE RECEIVED ANTIBIOTIC  A MONTH AGO    Current diabetic medications include LANTUS  60  UNITS  AND GLIPIZIDE 5 MG  XR .    JANUMET WAS TRIED IN HIGHER DOSES BUT HAD TO LOWER THE LOYA FOR BOWEL INCONTINENCE TOO    BUT SHE WAS ALSO ON STEROIDS AND MANY              Review of Systems   As above       Physical Exam    Constitutional: She is oriented to person, place, and time. She appears well-developed and well-nourished.    Psychiatric: She has a normal mood and affect.

## 2024-09-04 NOTE — PROGRESS NOTES
I have reviewed all needed documentation in preparation for visit. Verified patient by name and date of birth  Adia Quiroz is a 63 y.o. female Diabetes (Type 2 diabetes mellitus with hyperglycemia, with long-term current use of insulin ([facility], [facility]))  .    Vitals:    09/04/24 1111   BP: 139/88   Site: Left Upper Arm   Pulse: (!) 101   Temp: 97.3 °F (36.3 °C)   SpO2: 98%   Weight: 102.8 kg (226 lb 9.6 oz)   Height: 1.6 m (5' 3\")       No LMP recorded.         Health Maintenance Review: Patient reminded of \"due or due soon\" health maintenance. I have asked the patient to contact his/her primary care provider (PCP) for follow-up on his/her health maintenance.    \"Have you been to the ER, urgent care clinic since your last visit?  Hospitalized since your last visit?\"    NO    “Have you seen or consulted any other health care providers outside of [facility] since your last visit?”    NO

## 2025-01-15 ENCOUNTER — APPOINTMENT (OUTPATIENT)
Facility: HOSPITAL | Age: 65
End: 2025-01-15
Payer: MEDICARE

## 2025-01-15 ENCOUNTER — HOSPITAL ENCOUNTER (EMERGENCY)
Facility: HOSPITAL | Age: 65
Discharge: HOME | End: 2025-01-15
Attending: EMERGENCY MEDICINE
Payer: MEDICARE

## 2025-01-15 VITALS
HEART RATE: 81 BPM | RESPIRATION RATE: 16 BRPM | SYSTOLIC BLOOD PRESSURE: 149 MMHG | TEMPERATURE: 98 F | OXYGEN SATURATION: 99 % | DIASTOLIC BLOOD PRESSURE: 76 MMHG

## 2025-01-15 DIAGNOSIS — R10.9 ACUTE RIGHT FLANK PAIN: ICD-10-CM

## 2025-01-15 DIAGNOSIS — K59.00 CONSTIPATION, UNSPECIFIED CONSTIPATION TYPE: ICD-10-CM

## 2025-01-15 DIAGNOSIS — E11.65 HYPERGLYCEMIA DUE TO DIABETES MELLITUS (HCC): Primary | ICD-10-CM

## 2025-01-15 LAB
ALBUMIN SERPL-MCNC: 3.2 G/DL (ref 3.5–5)
ALBUMIN/GLOB SERPL: 0.8 (ref 1.1–2.2)
ALP SERPL-CCNC: 82 U/L (ref 45–117)
ALT SERPL-CCNC: 29 U/L (ref 12–78)
ANION GAP SERPL CALC-SCNC: 4 MMOL/L (ref 2–12)
APPEARANCE UR: CLEAR
AST SERPL-CCNC: 20 U/L (ref 15–37)
BACTERIA URNS QL MICRO: NEGATIVE /HPF
BASOPHILS # BLD: 0.04 K/UL (ref 0–0.1)
BASOPHILS NFR BLD: 0.4 % (ref 0–1)
BILIRUB SERPL-MCNC: 0.3 MG/DL (ref 0.2–1)
BILIRUB UR QL: NEGATIVE
BUN SERPL-MCNC: 15 MG/DL (ref 6–20)
BUN/CREAT SERPL: 17 (ref 12–20)
CALCIUM SERPL-MCNC: 9 MG/DL (ref 8.5–10.1)
CHLORIDE SERPL-SCNC: 108 MMOL/L (ref 97–108)
CO2 SERPL-SCNC: 25 MMOL/L (ref 21–32)
COLOR UR: ABNORMAL
COMMENT:: NORMAL
CREAT SERPL-MCNC: 0.86 MG/DL (ref 0.55–1.02)
D DIMER PPP FEU-MCNC: 0.55 MG/L FEU (ref 0–0.65)
DIFFERENTIAL METHOD BLD: ABNORMAL
EOSINOPHIL # BLD: 0.14 K/UL (ref 0–0.4)
EOSINOPHIL NFR BLD: 1.3 % (ref 0–7)
EPITH CASTS URNS QL MICRO: ABNORMAL /LPF
ERYTHROCYTE [DISTWIDTH] IN BLOOD BY AUTOMATED COUNT: 13.9 % (ref 11.5–14.5)
FLUAV RNA SPEC QL NAA+PROBE: NOT DETECTED
FLUBV RNA SPEC QL NAA+PROBE: NOT DETECTED
GLOBULIN SER CALC-MCNC: 4 G/DL (ref 2–4)
GLUCOSE BLD STRIP.AUTO-MCNC: 263 MG/DL (ref 65–117)
GLUCOSE BLD STRIP.AUTO-MCNC: 299 MG/DL (ref 65–117)
GLUCOSE SERPL-MCNC: 318 MG/DL (ref 65–100)
GLUCOSE UR STRIP.AUTO-MCNC: >1000 MG/DL
HCT VFR BLD AUTO: 34.9 % (ref 35–47)
HGB BLD-MCNC: 11.7 G/DL (ref 11.5–16)
HGB UR QL STRIP: NEGATIVE
HYALINE CASTS URNS QL MICRO: ABNORMAL /LPF (ref 0–2)
IMM GRANULOCYTES # BLD AUTO: 0.1 K/UL (ref 0–0.04)
IMM GRANULOCYTES NFR BLD AUTO: 0.9 % (ref 0–0.5)
KETONES UR QL STRIP.AUTO: ABNORMAL MG/DL
LEUKOCYTE ESTERASE UR QL STRIP.AUTO: NEGATIVE
LYMPHOCYTES # BLD: 2.32 K/UL (ref 0.8–3.5)
LYMPHOCYTES NFR BLD: 21.4 % (ref 12–49)
MAGNESIUM SERPL-MCNC: 1.9 MG/DL (ref 1.6–2.4)
MCH RBC QN AUTO: 29.5 PG (ref 26–34)
MCHC RBC AUTO-ENTMCNC: 33.5 G/DL (ref 30–36.5)
MCV RBC AUTO: 88.1 FL (ref 80–99)
MONOCYTES # BLD: 0.72 K/UL (ref 0–1)
MONOCYTES NFR BLD: 6.6 % (ref 5–13)
NEUTS SEG # BLD: 7.52 K/UL (ref 1.8–8)
NEUTS SEG NFR BLD: 69.4 % (ref 32–75)
NITRITE UR QL STRIP.AUTO: NEGATIVE
NRBC # BLD: 0 K/UL (ref 0–0.01)
NRBC BLD-RTO: 0 PER 100 WBC
NT PRO BNP: 109 PG/ML
PH UR STRIP: 6 (ref 5–8)
PLATELET # BLD AUTO: 228 K/UL (ref 150–400)
PMV BLD AUTO: 11.6 FL (ref 8.9–12.9)
POTASSIUM SERPL-SCNC: 4.1 MMOL/L (ref 3.5–5.1)
PROT SERPL-MCNC: 7.2 G/DL (ref 6.4–8.2)
PROT UR STRIP-MCNC: NEGATIVE MG/DL
RBC # BLD AUTO: 3.96 M/UL (ref 3.8–5.2)
RBC #/AREA URNS HPF: ABNORMAL /HPF (ref 0–5)
SARS-COV-2 RNA RESP QL NAA+PROBE: NOT DETECTED
SERVICE CMNT-IMP: ABNORMAL
SERVICE CMNT-IMP: ABNORMAL
SODIUM SERPL-SCNC: 137 MMOL/L (ref 136–145)
SOURCE: NORMAL
SP GR UR REFRACTOMETRY: 1.02 (ref 1–1.03)
SPECIMEN HOLD: NORMAL
SPECIMEN HOLD: NORMAL
TROPONIN I SERPL HS-MCNC: 4 NG/L (ref 0–51)
UROBILINOGEN UR QL STRIP.AUTO: 1 EU/DL (ref 0.2–1)
WBC # BLD AUTO: 10.8 K/UL (ref 3.6–11)
WBC URNS QL MICRO: ABNORMAL /HPF (ref 0–4)

## 2025-01-15 PROCEDURE — 36415 COLL VENOUS BLD VENIPUNCTURE: CPT

## 2025-01-15 PROCEDURE — 87636 SARSCOV2 & INF A&B AMP PRB: CPT

## 2025-01-15 PROCEDURE — 80053 COMPREHEN METABOLIC PANEL: CPT

## 2025-01-15 PROCEDURE — 93005 ELECTROCARDIOGRAM TRACING: CPT | Performed by: PHYSICIAN ASSISTANT

## 2025-01-15 PROCEDURE — 6370000000 HC RX 637 (ALT 250 FOR IP): Performed by: PHYSICIAN ASSISTANT

## 2025-01-15 PROCEDURE — 83735 ASSAY OF MAGNESIUM: CPT

## 2025-01-15 PROCEDURE — 85379 FIBRIN DEGRADATION QUANT: CPT

## 2025-01-15 PROCEDURE — 83880 ASSAY OF NATRIURETIC PEPTIDE: CPT

## 2025-01-15 PROCEDURE — 81001 URINALYSIS AUTO W/SCOPE: CPT

## 2025-01-15 PROCEDURE — 6360000004 HC RX CONTRAST MEDICATION: Performed by: EMERGENCY MEDICINE

## 2025-01-15 PROCEDURE — 74177 CT ABD & PELVIS W/CONTRAST: CPT

## 2025-01-15 PROCEDURE — 71046 X-RAY EXAM CHEST 2 VIEWS: CPT

## 2025-01-15 PROCEDURE — 85025 COMPLETE CBC W/AUTO DIFF WBC: CPT

## 2025-01-15 PROCEDURE — 96374 THER/PROPH/DIAG INJ IV PUSH: CPT

## 2025-01-15 PROCEDURE — 2580000003 HC RX 258: Performed by: PHYSICIAN ASSISTANT

## 2025-01-15 PROCEDURE — 99285 EMERGENCY DEPT VISIT HI MDM: CPT

## 2025-01-15 PROCEDURE — 96361 HYDRATE IV INFUSION ADD-ON: CPT

## 2025-01-15 PROCEDURE — 82962 GLUCOSE BLOOD TEST: CPT

## 2025-01-15 PROCEDURE — 96375 TX/PRO/DX INJ NEW DRUG ADDON: CPT

## 2025-01-15 PROCEDURE — 6360000002 HC RX W HCPCS: Performed by: PHYSICIAN ASSISTANT

## 2025-01-15 PROCEDURE — 84484 ASSAY OF TROPONIN QUANT: CPT

## 2025-01-15 RX ORDER — IOPAMIDOL 755 MG/ML
100 INJECTION, SOLUTION INTRAVASCULAR
Status: COMPLETED | OUTPATIENT
Start: 2025-01-15 | End: 2025-01-15

## 2025-01-15 RX ORDER — IBUPROFEN 600 MG/1
600 TABLET, FILM COATED ORAL EVERY 8 HOURS PRN
Qty: 15 TABLET | Refills: 0 | Status: SHIPPED | OUTPATIENT
Start: 2025-01-15 | End: 2025-01-20

## 2025-01-15 RX ORDER — 0.9 % SODIUM CHLORIDE 0.9 %
1000 INTRAVENOUS SOLUTION INTRAVENOUS ONCE
Status: COMPLETED | OUTPATIENT
Start: 2025-01-15 | End: 2025-01-15

## 2025-01-15 RX ORDER — ONDANSETRON 4 MG/1
4 TABLET, FILM COATED ORAL 3 TIMES DAILY PRN
Qty: 15 TABLET | Refills: 0 | Status: SHIPPED | OUTPATIENT
Start: 2025-01-15

## 2025-01-15 RX ORDER — KETOROLAC TROMETHAMINE 15 MG/ML
15 INJECTION, SOLUTION INTRAMUSCULAR; INTRAVENOUS
Status: COMPLETED | OUTPATIENT
Start: 2025-01-15 | End: 2025-01-15

## 2025-01-15 RX ORDER — ONDANSETRON 2 MG/ML
4 INJECTION INTRAMUSCULAR; INTRAVENOUS ONCE
Status: COMPLETED | OUTPATIENT
Start: 2025-01-15 | End: 2025-01-15

## 2025-01-15 RX ORDER — POLYETHYLENE GLYCOL 3350 17 G/17G
17 POWDER, FOR SOLUTION ORAL DAILY PRN
Qty: 30 PACKET | Refills: 0 | Status: SHIPPED | OUTPATIENT
Start: 2025-01-15 | End: 2025-02-14

## 2025-01-15 RX ORDER — ONDANSETRON 2 MG/ML
4 INJECTION INTRAMUSCULAR; INTRAVENOUS ONCE
Status: DISCONTINUED | OUTPATIENT
Start: 2025-01-15 | End: 2025-01-15

## 2025-01-15 RX ADMIN — ONDANSETRON 4 MG: 2 INJECTION, SOLUTION INTRAMUSCULAR; INTRAVENOUS at 19:54

## 2025-01-15 RX ADMIN — HUMAN INSULIN 5 UNITS: 100 INJECTION, SOLUTION SUBCUTANEOUS at 19:35

## 2025-01-15 RX ADMIN — IOPAMIDOL 100 ML: 755 INJECTION, SOLUTION INTRAVENOUS at 19:12

## 2025-01-15 RX ADMIN — KETOROLAC TROMETHAMINE 15 MG: 15 INJECTION, SOLUTION INTRAMUSCULAR; INTRAVENOUS at 19:35

## 2025-01-15 RX ADMIN — SODIUM CHLORIDE 1000 ML: 9 INJECTION, SOLUTION INTRAVENOUS at 19:34

## 2025-01-15 ASSESSMENT — ENCOUNTER SYMPTOMS
SHORTNESS OF BREATH: 1
NAUSEA: 1

## 2025-01-15 ASSESSMENT — PAIN SCALES - GENERAL: PAINLEVEL_OUTOF10: 0

## 2025-01-15 ASSESSMENT — PAIN - FUNCTIONAL ASSESSMENT: PAIN_FUNCTIONAL_ASSESSMENT: 0-10

## 2025-01-15 NOTE — ED PROVIDER NOTES
tunnel syndrome)      DJD (degenerative joint disease) of lumbar spine     Fatty liver     Fibromyalgia     HBP (high blood pressure)      Hiatal hernia     Lumbar disc disease      Lumbar spondylosis 12/10/13    Peripheral edema      Radiculitis 12/10/13    lumbar    Trigger thumb of left hand 6/17/2013    Injection left thumb mid March 2013, Dr Morales    Urinary incontinence       Dr. anthony leonard         SURGICAL HISTORY       Past Surgical History:   Procedure Laterality Date    COLONOSCOPY      nov 2010; normal    GI      cholestcystec    GYN      bladder neck    HERNIA REPAIR      hiatal hernia    ORTHOPEDIC SURGERY      carpal and tarsal tunnel released    ORTHOPEDIC SURGERY  4/29/2013    2nd and 4th finger, right hand, trigger release         CURRENT MEDICATIONS       Previous Medications    ALBUTEROL (PROVENTIL) (2.5 MG/3ML) 0.083% NEBULIZER SOLUTION    Inhale into the lungs every 6 hours as needed    ALBUTEROL SULFATE HFA (PROVENTIL;VENTOLIN;PROAIR) 108 (90 BASE) MCG/ACT INHALER    Inhale 1 puff into the lungs every 6 hours as needed    AMLODIPINE (NORVASC) 10 MG TABLET    TAKE 1 TABLET BY MOUTH EVERY DAY    ASPIRIN 81 MG EC TABLET    Take by mouth daily    DULAGLUTIDE (TRULICITY) 3 MG/0.5ML SOPN    Inject 3 mg into the skin once a week Stop 1.5 mg    DULOXETINE (CYMBALTA) 60 MG EXTENDED RELEASE CAPSULE    TAKE 1 CAPSULE BY MOUTH EVERY DAY    EZETIMIBE (ZETIA) 10 MG TABLET        FAMOTIDINE (PEPCID) 20 MG TABLET    Take by mouth as needed    GABAPENTIN (NEURONTIN) 400 MG CAPSULE    Take by mouth 3 times daily.    INSULIN ASPART (NOVOLOG) 100 UNIT/ML INJECTION PEN    Increase    5  units before breakfast, lunch and dinner. Plus sliding scale to max 50 units daily.    INSULIN GLARGINE (BASAGLAR KWIKPEN) 100 UNIT/ML INJECTION PEN    Inject 70 units at bedtime.    LIDOCAINE (LIDODERM) 5 %    APPLY TO AFFECTED AREA FOR 12 HOURS THEN REST FOR 12 HOURS    LORATADINE (CLARITIN) 10 MG TABLET    Take by mouth daily

## 2025-01-15 NOTE — ED TRIAGE NOTES
Pt arrives to the ER for complaints of weakness, lightheaded, and short of breath.     Reports that she has been having elevated blood sugars. Reports that her BG has been above 400s.     States that she was diagnosed with a respiratory infection and started on ABX and a medrol pack.     Blood sugar in triage is 299. Reports that she took 10 units of insulin at 1400.

## 2025-01-16 LAB
EKG ATRIAL RATE: 79 BPM
EKG DIAGNOSIS: NORMAL
EKG P AXIS: 41 DEGREES
EKG P-R INTERVAL: 150 MS
EKG Q-T INTERVAL: 398 MS
EKG QRS DURATION: 70 MS
EKG QTC CALCULATION (BAZETT): 456 MS
EKG R AXIS: 14 DEGREES
EKG T AXIS: -5 DEGREES
EKG VENTRICULAR RATE: 79 BPM

## 2025-01-16 PROCEDURE — 93010 ELECTROCARDIOGRAM REPORT: CPT | Performed by: STUDENT IN AN ORGANIZED HEALTH CARE EDUCATION/TRAINING PROGRAM

## 2025-01-16 NOTE — DISCHARGE INSTRUCTIONS
As we discussed make sure to call your primary care provider within 24 hours for close outpatient follow-up.  They can discuss with you new diabetic management plan.  Make sure that you are avoiding sugary foods and sugary drinks.  Make sure that you are eating plenty of vegetables and foods with fiber, drinking 32 to 64 ounces of water a day, exercising on a regular basis to help with constipation.  Take medication as prescribed.  Return immediately if any new or worsening symptoms.  Thank you for allowing us to be a part of your care.

## 2025-01-16 NOTE — ED NOTES
Pt was discharged at this time.  pt verbalized understanding of all discharge instructions. Pt remains a&ox3. Resps are even and unlabored. Skin warm and dry. No distress noted. Pt ambulated out of ed with a steady gait.    
sluggish

## 2025-02-12 LAB
GFR, ESTIMATED: 81
HBA1C MFR BLD HPLC: 9.9 %

## 2025-03-14 ENCOUNTER — HOSPITAL ENCOUNTER (OUTPATIENT)
Facility: HOSPITAL | Age: 65
Discharge: HOME | End: 2025-03-17
Payer: MEDICARE

## 2025-03-14 ENCOUNTER — TRANSCRIBE ORDERS (OUTPATIENT)
Facility: HOSPITAL | Age: 65
End: 2025-03-14

## 2025-03-14 VITALS — WEIGHT: 226 LBS | BODY MASS INDEX: 40.04 KG/M2 | HEIGHT: 63 IN

## 2025-03-14 DIAGNOSIS — Z12.31 OTHER SCREENING MAMMOGRAM: Primary | ICD-10-CM

## 2025-03-14 DIAGNOSIS — Z12.31 OTHER SCREENING MAMMOGRAM: ICD-10-CM

## 2025-03-14 PROCEDURE — 77063 BREAST TOMOSYNTHESIS BI: CPT

## 2025-03-28 ENCOUNTER — TELEPHONE (OUTPATIENT)
Age: 65
End: 2025-03-28

## 2025-03-28 DIAGNOSIS — E11.65 TYPE 2 DIABETES MELLITUS WITH HYPERGLYCEMIA, WITH LONG-TERM CURRENT USE OF INSULIN (HCC): Primary | ICD-10-CM

## 2025-03-28 DIAGNOSIS — Z79.4 TYPE 2 DIABETES MELLITUS WITH HYPERGLYCEMIA, WITH LONG-TERM CURRENT USE OF INSULIN (HCC): Primary | ICD-10-CM

## 2025-03-28 DIAGNOSIS — E78.2 MIXED HYPERLIPIDEMIA: ICD-10-CM

## 2025-03-28 DIAGNOSIS — Z79.4 LONG TERM (CURRENT) USE OF INSULIN (HCC): ICD-10-CM

## 2025-03-28 NOTE — TELEPHONE ENCOUNTER
Patient left voice mail asking for lab orders to be sent to lab [facility], orders I see exp in Jan?? Please add orders and notify front or contact patient on whether labs needed

## 2025-04-01 LAB
ALBUMIN SERPL-MCNC: 4.4 G/DL (ref 3.9–4.9)
ALBUMIN/CREAT UR: 41 MG/G CREAT (ref 0–29)
ALP SERPL-CCNC: 84 IU/L (ref 44–121)
ALT SERPL-CCNC: 29 IU/L (ref 0–32)
AST SERPL-CCNC: 31 IU/L (ref 0–40)
BILIRUB SERPL-MCNC: 0.5 MG/DL (ref 0–1.2)
BUN SERPL-MCNC: 10 MG/DL (ref 8–27)
BUN/CREAT SERPL: 14 (ref 12–28)
CALCIUM SERPL-MCNC: 9.5 MG/DL (ref 8.7–10.3)
CHLORIDE SERPL-SCNC: 100 MMOL/L (ref 96–106)
CHOLEST SERPL-MCNC: 201 MG/DL (ref 100–199)
CO2 SERPL-SCNC: 22 MMOL/L (ref 20–29)
CREAT SERPL-MCNC: 0.7 MG/DL (ref 0.57–1)
CREAT UR-MCNC: 88.6 MG/DL
EGFRCR SERPLBLD CKD-EPI 2021: 97 ML/MIN/1.73
GLOBULIN SER CALC-MCNC: 3.1 G/DL (ref 1.5–4.5)
GLUCOSE SERPL-MCNC: 287 MG/DL (ref 70–99)
HBA1C MFR BLD: 10 % (ref 4.8–5.6)
HDLC SERPL-MCNC: 37 MG/DL
IMP & REVIEW OF LAB RESULTS: NORMAL
LDLC SERPL CALC-MCNC: 136 MG/DL (ref 0–99)
Lab: NORMAL
MICROALBUMIN UR-MCNC: 36.5 UG/ML
POTASSIUM SERPL-SCNC: 4.3 MMOL/L (ref 3.5–5.2)
PROT SERPL-MCNC: 7.5 G/DL (ref 6–8.5)
SODIUM SERPL-SCNC: 137 MMOL/L (ref 134–144)
TRIGL SERPL-MCNC: 156 MG/DL (ref 0–149)
VLDLC SERPL CALC-MCNC: 28 MG/DL (ref 5–40)

## 2025-04-09 ENCOUNTER — TELEPHONE (OUTPATIENT)
Age: 65
End: 2025-04-09

## 2025-04-09 DIAGNOSIS — Z79.4 LONG TERM (CURRENT) USE OF INSULIN (HCC): ICD-10-CM

## 2025-04-09 DIAGNOSIS — Z79.4 TYPE 2 DIABETES MELLITUS WITH HYPERGLYCEMIA, WITH LONG-TERM CURRENT USE OF INSULIN (HCC): Primary | ICD-10-CM

## 2025-04-09 DIAGNOSIS — E11.65 TYPE 2 DIABETES MELLITUS WITH HYPERGLYCEMIA, WITH LONG-TERM CURRENT USE OF INSULIN (HCC): Primary | ICD-10-CM

## 2025-04-09 RX ORDER — ACYCLOVIR 400 MG/1
TABLET ORAL
Qty: 9 EACH | Refills: 3 | Status: SHIPPED | OUTPATIENT
Start: 2025-04-09

## 2025-04-09 RX ORDER — PEN NEEDLE, DIABETIC 30 GX3/16"
NEEDLE, DISPOSABLE MISCELLANEOUS
Qty: 400 EACH | Refills: 3 | Status: SHIPPED | OUTPATIENT
Start: 2025-04-09

## 2025-04-09 NOTE — TELEPHONE ENCOUNTER
Called and spoke to pt about G7 sensors. Pt stated she was getting Phoebe through [employer], at some point started using G6- had BCBS, now has Medicare & BCBS she wants G7. Informed pt might need PA don't know yet, she will need to call Medicare find out supply company they prefer. Will send g7 sensors to [facility] per pt request for now.

## 2025-04-09 NOTE — TELEPHONE ENCOUNTER
Hi,    Pt requests - 31 gauge 8mm needles & a Dexcom G7 BGM. Please sent to:   [facility].    Thank you.

## 2025-04-19 NOTE — PROGRESS NOTES
MS. Mary Jo Hayes IS  61YEAR OLD PATIENT       HPI    Patient here for initial visit of Type 2 diabetes mellitus . Referred : by self/pcp    She says she has waited for 1 year to get an appointment to see us    H/o diabetes for 7   years  2014    Current A1C is over 12  % and symptoms/problems include  SKIN LESION     SHE HAD a1c s in the past  Ar   8 % -   SO SHE IS PERPLEXED ALSO WITH SUDDEN LOSS OF CONTROL        She says this sudden disturbance, she had epidural shots and that disturbed her diabetes   She has folliculitis  -  Going on for 2 years   She saw dermatology quite often  And it is not  Getting better       She says these lesions are PAINFUL AND TRYING EVERYTHING TO HELP HER SKIN LESIONS   SHE RECEIVED ANTIBIOTIC  A MONTH AGO     Current diabetic medications include LANTUS  60  UNITS  AND GLIPIZIDE 5 MG  XR .   JANUMET WAS TRIED IN HIGHER DOSES BUT HAD TO LOWER THE GILLESPIE FOR BOWEL INCONTINENCE TOO   BUT SHE WAS ALSO ON STEROIDS AND MANY     Current monitoring regimen: home blood tests - daily  Home blood sugar records: trend: fluctuating a lot  Any episodes of hypoglycemia?  yes - at times    Weight trend: increasing steadily  Prior visit with dietician: no  Current diet: \"healthy\" diet  in general  Current exercise: no regular exercise    Known diabetic complications: none  Cardiovascular risk factors: dyslipidemia, diabetes mellitus, obesity, hypertension, stress, post-menopausal    Eye exam current (within one year): yes  BHASKAR: unknown     Past Medical History:   Diagnosis Date    Anxiety      AR (allergic rhinitis)      dr Jax caicedo allergy & asthma    Asthma      Cellulitis of face 6/2016    [facility]- right side of face    CTS (carpal tunnel syndrome)      DJD (degenerative joint disease) of lumbar spine     Fatty liver     Fibromyalgia     HBP (high blood pressure)      Hiatal hernia     Lumbar disc disease      Lumbar spondylosis 12/10/13    Peripheral edema      Radiculitis 12/10/13 Patient: Yuni Jordan    Procedure Summary       Date: 04/18/25 Room / Location: [facility] 05 / [facility]    Anesthesia Start: 1810 Anesthesia Stop: 1946    Procedure: CHOLECYSTECTOMY, LAPAROSCOPIC Diagnosis:       Acute cholecystitis      (Acute cholecystitis [K81.0])    Surgeons: Maycol Walsh MD Responsible Provider: Pieter Bull MD    Anesthesia Type: general ASA Status: 3 - Emergent            Anesthesia Type: general    Vitals Value Taken Time   /78 04/18/25 21:30   Temp 37 °C (98.6 °F) 04/18/25 21:30   Pulse 94 04/18/25 21:30   Resp 14 04/18/25 21:30   SpO2 98 % 04/18/25 21:30       Anesthesia Post Evaluation    Patient location during evaluation: PACU  Patient participation: complete - patient participated  Level of consciousness: awake and alert  Pain management: adequate  Airway patency: patent  Cardiovascular status: acceptable and hemodynamically stable  Respiratory status: acceptable, spontaneous ventilation and nonlabored ventilation  Hydration status: acceptable  Postoperative Nausea and Vomiting: none        There were no known notable events for this encounter.     lumbar    Trigger thumb of left hand 6/17/2013    Injection left thumb mid March 2013, Dr Isabel Benjamin incontinence       Dr. Grace Ware     Past Surgical History:   Procedure Laterality Date    COLONOSCOPY      nov 2010; normal    HX GI      cholestcystec    HX GYN      bladder neck    HX HERNIA REPAIR      hiatal hernia    HX ORTHOPAEDIC      carpal and tarsal tunnel released    HX ORTHOPAEDIC  4/29/2013    2nd and 4th finger, right hand, trigger release     Current Outpatient Medications   Medication Sig    famotidine (Pepcid) 20 mg tablet Take 20 mg by mouth as needed for Heartburn.  gabapentin (NEURONTIN) 400 mg capsule Take 400 mg by mouth three (3) times daily.  aspirin delayed-release 81 mg tablet Take 81 mg by mouth daily.  montelukast (SINGULAIR) 10 mg tablet TAKE 1 TABLET BY MOUTH EVERY DAY    DULoxetine (CYMBALTA) 60 mg capsule TAKE 1 CAPSULE BY MOUTH EVERY DAY    amLODIPine (NORVASC) 10 mg tablet TAKE 1 TABLET BY MOUTH EVERY DAY    albuterol (PROVENTIL HFA, VENTOLIN HFA, PROAIR HFA) 90 mcg/actuation inhaler TAKE 1 PUFF BY INHALATION EVERY SIX (6) HOURS AS NEEDED FOR WHEEZING.  metoprolol succinate (TOPROL-XL) 100 mg tablet TAKE 1 TABLET BY MOUTH EVERY DAY    loratadine (CLARITIN) 10 mg tablet Take 10 mg by mouth daily.  cholecalciferol (Vitamin D3) (1000 Units /25 mcg) tablet 2000 iu 2-3  Times a week  Indications: prevention of vitamin D deficiency    albuterol (PROVENTIL VENTOLIN) 2.5 mg /3 mL (0.083 %) nebulizer solution 2.5 mg by Nebulization route every six (6) hours as needed. Indications: BRONCHOSPASM PREVENTION    MULTIVITS W-CA,FE,OTHER MIN (WOMEN'S DAILY MULTIVITAMIN PO) Take  by mouth two (2) days a week.  insulin glargine (Basaglar KwikPen U-100 Insulin) 100 unit/mL (3 mL) inpn Inject 70 units at bedtime.  dulaglutide (Trulicity) 9.30 ZA/3.3 mL sub-q pen 0.5 mL by SubCUTAneous route every seven (7) days.     insulin glargine (Lantus Solostar U-100 Insulin) 100 unit/mL (3 mL) inpn 70 Units by SubCUTAneous route nightly. Stop Glyburide    insulin aspart U-100 (NOVOLOG) 100 unit/mL (3 mL) inpn Inject 5 units before breakfast, lunch and dinner. Plus sliding scale to max 54 units daily.  Insulin Needles, Disposable, (Sonali Pen Needle) 32 gauge x 5/32\" ndle Use to inject insulin four times daily. Dx. Code E11.65    cyclobenzaprine (FLEXERIL) 10 mg tablet TAKE 1 TABLET BY MOUTH EVERY DAY AT NIGHT (Patient not taking: Reported on 8/12/2021)    mupirocin (BACTROBAN) 2 % ointment APPLY TO AFFECTED AREA EVERY DAY (Patient not taking: Reported on 8/12/2021)    furosemide (Lasix) 20 mg tablet Take 1 Tab by mouth daily. (Patient not taking: Reported on 8/12/2021)    hydroCHLOROthiazide (HYDRODIURIL) 25 mg tablet Take 1 Tab by mouth daily. (Patient not taking: Reported on 8/12/2021)    nystatin (MYCOSTATIN) 100,000 unit/mL suspension Take 5 mL by mouth four (4) times daily. swish and spit (Patient not taking: Reported on 8/12/2021)    ondansetron (ZOFRAN ODT) 8 mg disintegrating tablet Take 1 Tab by mouth every eight (8) hours as needed for Nausea. (Patient not taking: Reported on 8/12/2021)    JANUMET 50-1,000 mg per tablet TAKE 1 TAB BY MOUTH TWO (2) TIMES DAILY (WITH MEALS).  clotrimazole (LOTRIMIN) 1 % topical cream Apply  to affected area two (2) times a day. (Patient not taking: Reported on 8/12/2021)    b complex-c-e-zn (STRESSTABS W ZINC) tablet Take 1 Tab by mouth as needed. (Patient not taking: Reported on 8/12/2021)     No current facility-administered medications for this visit. Review of Systems   Constitutional: Negative. HENT: Negative. Eyes: Negative. Respiratory: Negative. Cardiovascular: Negative. Gastrointestinal: Negative. Genitourinary: Negative. Musculoskeletal: Negative. Skin: Positive for itching and rash. Neurological: Negative. Endo/Heme/Allergies: Negative.     Psychiatric/Behavioral: Negative. Vitals:    08/12/21 1024   BP: (!) 152/88   BP 1 Location: Left upper arm   BP Patient Position: Sitting   BP Cuff Size: Large adult   Pulse: 70   Temp: (!) 96.6 °F (35.9 °C)   TempSrc: Temporal   Resp: 18   Height: 5' 3\" (1.6 m)   Weight: 235 lb (106.6 kg)   SpO2: 97%        Physical Exam  Constitutional:       Appearance: She is well-developed. HENT:      Head: Normocephalic. Eyes:      Conjunctiva/sclera: Conjunctivae normal.      Pupils: Pupils are equal, round, and reactive to light. Cardiovascular:      Rate and Rhythm: Normal rate and regular rhythm. Pulmonary:      Effort: Pulmonary effort is normal.      Breath sounds: Normal breath sounds. Abdominal:      General: Bowel sounds are normal.      Palpations: Abdomen is soft. Musculoskeletal:         General: Normal range of motion. Cervical back: Normal range of motion and neck supple. Skin:     General: Skin is warm and dry. Comments: Has skin lesions resembling folliculitis   Neurological:      Mental Status: She is alert and oriented to person, place, and time. Deep Tendon Reflexes: Reflexes are normal and symmetric. [x] Recent labs have been reviewed from referring provider. [] Recent labs were not available at time of visit. Assessment and Plan :    1. Type 2 DM poorly controlled : A1c is 11.4% from May 2021 compared to 9.8% from March 14, 2021    Reviewed the glucose log : yes   The loss of control could be from stress and from her skin lesions which seem to be more bothersome with a lot of pain and itching    Discussed pathophysiology of diabetes type 2  Personally I felt like she would get the best help when I start her on insulin and she is agreeable to it  Discussed insulin therapy and actions of insulin.   Stopped glyburide    Also starting her on Trulicity but patient is instructed to start each of the medication stepwise week after week and to take notes about any changes in her skin rash    Provided printed insulin instructions as well as diet      Patient is advised to check blood sugars 4 times daily by rotation method. reviewed medications and side effects in detail  lab results and schedule of future lab studies reviewed with patient    specific diabetic recommendations: diabetic diet discussed in detail, written exchange diet given, home glucose monitoring emphasized, glucose meter dispensed to patient and use and side effects of insulin is taught    2. Hypoglycemia :  Educated on treating the hypoglycemia. 3. HTN : On amlodipine and hydrochlorothiazide and is taking Lasix    4. Dyslipidemia : Lipids are not available for review    5. use of aspirin to prevent MI and TIA's discussed      6. Diabetic complications :     A. Retinopathy-NO   educated on this complication,  regular f/u with ophthalmologist encouraged    B. Nephropathy - NO       C. CAD - has some form of arrythmia        7. Skin rash  -refer to ID   On Neurontin and Cymbalta  She has tried several medications with no help and it looks like referring her to ID consult to rule out M RSA    8. Diarrhea : METFORMIN - ?  SKIN RASH  OR   ? BOWEL INCONTINENCE          Reviewed results with patient and discussed the labs being ordered today/bnv  Patient voiced understanding of plan of care

## 2025-05-05 ENCOUNTER — OFFICE VISIT (OUTPATIENT)
Age: 65
End: 2025-05-05
Payer: MEDICARE

## 2025-05-05 VITALS
DIASTOLIC BLOOD PRESSURE: 93 MMHG | WEIGHT: 229.6 LBS | HEIGHT: 63 IN | OXYGEN SATURATION: 97 % | BODY MASS INDEX: 40.68 KG/M2 | SYSTOLIC BLOOD PRESSURE: 141 MMHG | HEART RATE: 92 BPM | TEMPERATURE: 97.4 F

## 2025-05-05 DIAGNOSIS — Z79.4 LONG TERM (CURRENT) USE OF INSULIN (HCC): ICD-10-CM

## 2025-05-05 DIAGNOSIS — E78.2 MIXED HYPERLIPIDEMIA: ICD-10-CM

## 2025-05-05 DIAGNOSIS — E11.65 TYPE 2 DIABETES MELLITUS WITH HYPERGLYCEMIA, WITH LONG-TERM CURRENT USE OF INSULIN (HCC): Primary | ICD-10-CM

## 2025-05-05 DIAGNOSIS — Z79.4 TYPE 2 DIABETES MELLITUS WITH HYPERGLYCEMIA, WITH LONG-TERM CURRENT USE OF INSULIN (HCC): Primary | ICD-10-CM

## 2025-05-05 PROCEDURE — 95251 CONT GLUC MNTR ANALYSIS I&R: CPT | Performed by: INTERNAL MEDICINE

## 2025-05-05 PROCEDURE — G8417 CALC BMI ABV UP PARAM F/U: HCPCS | Performed by: INTERNAL MEDICINE

## 2025-05-05 PROCEDURE — G8427 DOCREV CUR MEDS BY ELIG CLIN: HCPCS | Performed by: INTERNAL MEDICINE

## 2025-05-05 PROCEDURE — 1036F TOBACCO NON-USER: CPT | Performed by: INTERNAL MEDICINE

## 2025-05-05 PROCEDURE — 99214 OFFICE O/P EST MOD 30 MIN: CPT | Performed by: INTERNAL MEDICINE

## 2025-05-05 PROCEDURE — 3080F DIAST BP >= 90 MM HG: CPT | Performed by: INTERNAL MEDICINE

## 2025-05-05 PROCEDURE — 3077F SYST BP >= 140 MM HG: CPT | Performed by: INTERNAL MEDICINE

## 2025-05-05 PROCEDURE — 3046F HEMOGLOBIN A1C LEVEL >9.0%: CPT | Performed by: INTERNAL MEDICINE

## 2025-05-05 PROCEDURE — 2022F DILAT RTA XM EVC RTNOPTHY: CPT | Performed by: INTERNAL MEDICINE

## 2025-05-05 PROCEDURE — 3017F COLORECTAL CA SCREEN DOC REV: CPT | Performed by: INTERNAL MEDICINE

## 2025-05-05 RX ORDER — DULOXETIN HYDROCHLORIDE 60 MG/1
CAPSULE, DELAYED RELEASE ORAL
Qty: 30 CAPSULE | Refills: 1 | Status: SHIPPED | OUTPATIENT
Start: 2025-05-05

## 2025-05-05 RX ORDER — EZETIMIBE 10 MG/1
TABLET ORAL
Qty: 90 TABLET | Refills: 1 | Status: SHIPPED | OUTPATIENT
Start: 2025-05-05

## 2025-05-05 RX ORDER — INSULIN GLARGINE 100 [IU]/ML
INJECTION, SOLUTION SUBCUTANEOUS
Qty: 60 ML | Refills: 3 | Status: SHIPPED | OUTPATIENT
Start: 2025-05-05

## 2025-05-05 NOTE — PATIENT INSTRUCTIONS
SPECIFIC INSTRUCTIONS BELOW     DRINK water   Do not skip meals  Do not eat in between meals     Reduce carbs- pasta, rice, potatoes, bread   Try to avoid processed bread products like BISCUITS, CROISSANTS, MUFFINS     Do not drink juices or sodas, even if they are calorie zero or diet drinks and especially avoid using powders like crystalloids , ASAF-AIDS      Do not eat peanut butter      Do not eat sugar free cookies and cakes   Do not eat peaches, oranges, pineapples, raisins, grapes , canteloupe , honey dew, mangoes , watermelon  and fruit medleys           --------------------------------------------------------------------------------------------------------------        TRULICITY  3    mg a week          Check blood sugars immediately before each meal         Take  basaglar    insulin  70 units  at bed time      Take NOVOLOG     Insulin   5     UNITS BEFORE EACH MEAL      Also, add additional NOVOLOG  as follows with meals  If blood sugars are::     151-200 mg 2 units     201-250 mg 5  units     251-300 mg 8 units     301-350 mg 11 units     351-400 mg 14 units     401-450 mg 17  units     451-500 mg 20 units     Less than 70 mg NO INSULIN    ---------------------------------------------------------------------------------------------------------------      Follow the steps  to get started on  DEXCOM / freestyle Phoebe 2  for Medicare patients       Keep a log of blood sugars by checking 4 times a day    you must be on insulin shots at least 3 times a day   3.  Contact one of the following  DME suppliers  and find out who accepts your plan   4. Give that supplier  our  office info  and fax number       [employer]  is best  for  freestyle PHOEBE 2 - [phone]      [facility] [employer]  [phone]      [employer] [facility] [employer]   [phone]

## 2025-05-05 NOTE — PROGRESS NOTES
[facility] DIABETES AND ENDOCRINOLOGY                Lori Ladd MD FACE          MS. REYES   is   64  YEAR OLD PATIENT          HPI      Patient here for  follow up after last  visit for  Type 2 diabetes mellitus   From  sept 2024      She stayed busy with her son's father , who is dialysis dependant   Pt now is back to using cane for back pain   She is advised to have back ablation surgery  to relieve her back pain, she is thinking about it    Gained 3 lbs ( she got epidural shot )     Has no meter or log in hand           Sept 2024      Pt  is able to walk without cane, she went thru PT   She says no reason was found for her  inability to walk    She stayed busy with her son's father , who is dialysis dependant     She has a new PCP   She has the ulcer on her forehead   She is not wanting to go for surgery   Pcp stopped statin,  for myalgias   and got  zetia         Jan 2024     She looks happier  ( for first time )   She continues to have  skin trouble  -   saw a surgeon on forehead  ulcer   She feels like she got control on her diet   Lost 1 lb   She has back issues - got epidurals and she finds her LE still weak         Aug 12 2021       Referred : by self/pcp   She says she has waited for 1 year to get an appointment to see us   H/o diabetes for 7   years  2014   Current A1C is over 12  % and symptoms/problems include  SKIN LESION    SHE HAD a1c s in the past  Ar   8 % -    SO SHE IS PERPLEXED ALSO WITH SUDDEN LOSS OF CONTROL     She says this sudden disturbance, she had epidural shots and that disturbed her diabetes    She has folliculitis  -  Going on for 2 years    She saw dermatology quite often  And it is not  Getting better    She says these lesions are PAINFUL AND TRYING EVERYTHING TO HELP HER SKIN LESIONS    SHE RECEIVED ANTIBIOTIC  A MONTH AGO    Current diabetic medications include LANTUS  60  UNITS  AND GLIPIZIDE 5 MG  XR .    JANUMET WAS TRIED IN HIGHER DOSES BUT HAD TO LOWER

## 2025-05-05 NOTE — PROGRESS NOTES
Adia Quiroz is a 64 y.o. female here for   Chief Complaint   Patient presents with    Diabetes       1. Have you been to the ER, urgent care clinic since your last visit?  Hospitalized since your last visit? - [facility] 03/2025 URI, Pt 1st 3/2025 back issues, urinary incontinence, insomnia    2. Have you seen or consulted any other health care providers outside of the [facility] since your last visit?  Include any pap smears or colon screening.-Dr. Interiano

## 2025-06-13 PROBLEM — R39.15 URINARY URGENCY: Status: ACTIVE | Noted: 2025-06-13

## 2025-06-16 ENCOUNTER — OFFICE VISIT (OUTPATIENT)
Age: 65
End: 2025-06-16
Payer: MEDICARE

## 2025-06-16 VITALS — DIASTOLIC BLOOD PRESSURE: 86 MMHG | HEART RATE: 91 BPM | SYSTOLIC BLOOD PRESSURE: 137 MMHG

## 2025-06-16 DIAGNOSIS — N39.41 URGENCY INCONTINENCE: ICD-10-CM

## 2025-06-16 DIAGNOSIS — R39.15 URINARY URGENCY: Primary | ICD-10-CM

## 2025-06-16 DIAGNOSIS — R33.9 INCOMPLETE EMPTYING OF BLADDER: ICD-10-CM

## 2025-06-16 DIAGNOSIS — N95.2 ATROPHIC VAGINITIS: ICD-10-CM

## 2025-06-16 DIAGNOSIS — N39.0 RECURRENT UTI: ICD-10-CM

## 2025-06-16 DIAGNOSIS — R82.81 PYURIA: ICD-10-CM

## 2025-06-16 DIAGNOSIS — Q64.31 URETHRA AND BLADDER NECK ATRESIA AND STENOSIS: ICD-10-CM

## 2025-06-16 DIAGNOSIS — N36.8 ATROPHY OF URETHRA: ICD-10-CM

## 2025-06-16 LAB
BILIRUBIN, URINE, POC: NEGATIVE
BLOOD URINE, POC: ABNORMAL
GLUCOSE URINE, POC: NEGATIVE
KETONES, URINE, POC: NEGATIVE
LEUKOCYTE ESTERASE, URINE, POC: ABNORMAL
NITRITE, URINE, POC: NEGATIVE
PH, URINE, POC: 7 (ref 4.6–8)
PROTEIN,URINE, POC: NEGATIVE
SPECIFIC GRAVITY, URINE, POC: 1.01 (ref 1–1.03)
URINALYSIS CLARITY, POC: CLEAR
URINALYSIS COLOR, POC: YELLOW
UROBILINOGEN, POC: ABNORMAL

## 2025-06-16 PROCEDURE — 3017F COLORECTAL CA SCREEN DOC REV: CPT | Performed by: UROLOGY

## 2025-06-16 PROCEDURE — G8427 DOCREV CUR MEDS BY ELIG CLIN: HCPCS | Performed by: UROLOGY

## 2025-06-16 PROCEDURE — 3075F SYST BP GE 130 - 139MM HG: CPT | Performed by: UROLOGY

## 2025-06-16 PROCEDURE — 3079F DIAST BP 80-89 MM HG: CPT | Performed by: UROLOGY

## 2025-06-16 PROCEDURE — 81003 URINALYSIS AUTO W/O SCOPE: CPT | Performed by: UROLOGY

## 2025-06-16 PROCEDURE — 99204 OFFICE O/P NEW MOD 45 MIN: CPT | Performed by: UROLOGY

## 2025-06-16 PROCEDURE — G8417 CALC BMI ABV UP PARAM F/U: HCPCS | Performed by: UROLOGY

## 2025-06-16 PROCEDURE — 1036F TOBACCO NON-USER: CPT | Performed by: UROLOGY

## 2025-06-16 RX ORDER — ESTRADIOL 0.1 MG/G
1 CREAM VAGINAL
Qty: 42.5 G | Refills: 3 | Status: SHIPPED | OUTPATIENT
Start: 2025-06-16

## 2025-06-16 RX ORDER — CEPHALEXIN 500 MG/1
500 CAPSULE ORAL 2 TIMES DAILY
Qty: 14 CAPSULE | Refills: 0 | Status: SHIPPED | OUTPATIENT
Start: 2025-06-16 | End: 2025-06-23

## 2025-06-16 ASSESSMENT — ENCOUNTER SYMPTOMS
SINUS PAIN: 1
SORE THROAT: 1
BACK PAIN: 1

## 2025-06-16 NOTE — PROGRESS NOTES
HISTORY OF PRESENT ILLNESS  Adia Quiroz is a 64 y.o. female   Patient came in with urgency frequency has to get the bathroom quickly as she wet all of herself.  She denies fevers chills flank pain nausea and vomiting.  1. Urinary urgency  -     AMB POC PVR, YULIYA,POST-VOID RES,US,NON-IMAGING  -     AMB POC URINALYSIS DIP STICK AUTO W/O MICRO  -     Urinalysis with Microscopic  -     estradiol (ESTRACE VAGINAL) 0.1 MG/GM vaginal cream; Place 1 g vaginally three times a week Apply fingertip amount/pea size directly to the opening of the urethra with a fingertip Monday Wednesday Friday do not use applicator, Disp-42.5 g, R-3Normal  2. Atrophic vaginitis  -     estradiol (ESTRACE VAGINAL) 0.1 MG/GM vaginal cream; Place 1 g vaginally three times a week Apply fingertip amount/pea size directly to the opening of the urethra with a fingertip Monday Wednesday Friday do not use applicator, Disp-42.5 g, R-3Normal  3. Atrophy of urethra  -     estradiol (ESTRACE VAGINAL) 0.1 MG/GM vaginal cream; Place 1 g vaginally three times a week Apply fingertip amount/pea size directly to the opening of the urethra with a fingertip Monday Wednesday Friday do not use applicator, Disp-42.5 g, R-3Normal  4. Recurrent UTI  -     AMB POC PVR, YULIYA,POST-VOID RES,US,NON-IMAGING  -     AMB POC URINALYSIS DIP STICK AUTO W/O MICRO  -     Culture, Urine  -     Urinalysis with Microscopic  -     estradiol (ESTRACE VAGINAL) 0.1 MG/GM vaginal cream; Place 1 g vaginally three times a week Apply fingertip amount/pea size directly to the opening of the urethra with a fingertip Monday Wednesday Friday do not use applicator, Disp-42.5 g, R-3Normal  -     cephALEXin (KEFLEX) 500 MG capsule; Take 1 capsule by mouth 2 times daily for 7 days, Disp-14 capsule, R-0Normal  5. Urgency incontinence  -     AMB POC PVR, YULIYA,POST-VOID RES,US,NON-IMAGING  -     AMB POC URINALYSIS DIP STICK AUTO W/O MICRO  -     estradiol (ESTRACE VAGINAL) 0.1 MG/GM vaginal cream;

## 2025-06-16 NOTE — PROGRESS NOTES
Chief Complaint   Patient presents with    New Patient    Urinary Urgency         /86 (BP Site: Left Upper Arm, Patient Position: Sitting, BP Cuff Size: Large Adult)   Pulse 91       1. Have you been to the ER, urgent care clinic since your last visit?  Hospitalized since your last visit?No    2. Have you seen or consulted any other health care providers outside of the [facility] since your last visit?  Include any pap smears or colon screening. No

## 2025-06-17 LAB
APPEARANCE UR: CLEAR
BACTERIA #/AREA URNS HPF: ABNORMAL /[HPF]
BILIRUB UR QL STRIP: NEGATIVE
CASTS URNS QL MICRO: ABNORMAL /LPF
COLOR UR: YELLOW
EPI CELLS #/AREA URNS HPF: ABNORMAL /HPF (ref 0–10)
GLUCOSE UR QL STRIP: NEGATIVE
HGB UR QL STRIP: NEGATIVE
KETONES UR QL STRIP: NEGATIVE
LEUKOCYTE ESTERASE UR QL STRIP: ABNORMAL
MICRO URNS: ABNORMAL
NITRITE UR QL STRIP: NEGATIVE
PH UR STRIP: 7 [PH] (ref 5–7.5)
PROT UR QL STRIP: NEGATIVE
RBC #/AREA URNS HPF: ABNORMAL /HPF (ref 0–2)
SP GR UR STRIP: 1.01 (ref 1–1.03)
UROBILINOGEN UR STRIP-MCNC: 0.2 MG/DL (ref 0.2–1)
WBC #/AREA URNS HPF: ABNORMAL /HPF (ref 0–5)

## 2025-06-20 LAB — BACTERIA UR CULT: ABNORMAL

## 2025-06-23 ENCOUNTER — TELEPHONE (OUTPATIENT)
Age: 65
End: 2025-06-23

## 2025-06-23 ENCOUNTER — RESULTS FOLLOW-UP (OUTPATIENT)
Age: 65
End: 2025-06-23

## 2025-06-23 RX ORDER — CIPROFLOXACIN 500 MG/1
500 TABLET, FILM COATED ORAL 2 TIMES DAILY
Qty: 14 TABLET | Refills: 0 | Status: SHIPPED | OUTPATIENT
Start: 2025-06-23 | End: 2025-06-30

## 2025-07-11 RX ORDER — CIPROFLOXACIN 500 MG/1
500 TABLET, FILM COATED ORAL 2 TIMES DAILY
Qty: 14 TABLET | Refills: 0 | Status: CANCELLED | OUTPATIENT
Start: 2025-07-11 | End: 2025-07-18

## 2025-07-14 ENCOUNTER — PROCEDURE VISIT (OUTPATIENT)
Age: 65
End: 2025-07-14
Payer: MEDICARE

## 2025-07-14 VITALS — DIASTOLIC BLOOD PRESSURE: 84 MMHG | HEART RATE: 84 BPM | SYSTOLIC BLOOD PRESSURE: 134 MMHG

## 2025-07-14 DIAGNOSIS — N36.8 ATROPHY OF URETHRA: Primary | ICD-10-CM

## 2025-07-14 DIAGNOSIS — N39.41 URGENCY INCONTINENCE: ICD-10-CM

## 2025-07-14 DIAGNOSIS — Q64.31 URETHRA AND BLADDER NECK ATRESIA AND STENOSIS: ICD-10-CM

## 2025-07-14 DIAGNOSIS — R39.15 URINARY URGENCY: ICD-10-CM

## 2025-07-14 DIAGNOSIS — N39.0 RECURRENT UTI: ICD-10-CM

## 2025-07-14 DIAGNOSIS — N95.2 ATROPHIC VAGINITIS: ICD-10-CM

## 2025-07-14 LAB
AVG FLOW RATE, POC: 11
BILIRUBIN, URINE, POC: NEGATIVE
BLOOD URINE, POC: NEGATIVE
FLOW TIME, POC: 47
GLUCOSE URINE, POC: NEGATIVE
KETONES, URINE, POC: NEGATIVE
LEUKOCYTE ESTERASE, URINE, POC: NEGATIVE
MAX FLOW RATE, POC: 22
NITRITE, URINE, POC: NEGATIVE
PH, URINE, POC: 6 (ref 4.6–8)
PROTEIN,URINE, POC: NEGATIVE
PVR, POC: 0 CC
SPECIFIC GRAVITY, URINE, POC: 1.01 (ref 1–1.03)
TIME TO MAX, POC: 9
URINALYSIS CLARITY, POC: CLEAR
URINALYSIS COLOR, POC: YELLOW
UROBILINOGEN, POC: NORMAL
VOIDED VOLUME, POC: 535
VOIDING TIME, POC: 49

## 2025-07-14 PROCEDURE — 81003 URINALYSIS AUTO W/O SCOPE: CPT | Performed by: UROLOGY

## 2025-07-14 PROCEDURE — 52000 CYSTOURETHROSCOPY: CPT | Performed by: UROLOGY

## 2025-07-14 PROCEDURE — 3075F SYST BP GE 130 - 139MM HG: CPT | Performed by: UROLOGY

## 2025-07-14 PROCEDURE — 3079F DIAST BP 80-89 MM HG: CPT | Performed by: UROLOGY

## 2025-07-14 PROCEDURE — 51741 ELECTRO-UROFLOWMETRY FIRST: CPT | Performed by: UROLOGY

## 2025-07-14 PROCEDURE — G8427 DOCREV CUR MEDS BY ELIG CLIN: HCPCS | Performed by: UROLOGY

## 2025-07-14 PROCEDURE — G8417 CALC BMI ABV UP PARAM F/U: HCPCS | Performed by: UROLOGY

## 2025-07-14 PROCEDURE — 1036F TOBACCO NON-USER: CPT | Performed by: UROLOGY

## 2025-07-14 PROCEDURE — 51725 SIMPLE CYSTOMETROGRAM: CPT | Performed by: UROLOGY

## 2025-07-14 PROCEDURE — 3017F COLORECTAL CA SCREEN DOC REV: CPT | Performed by: UROLOGY

## 2025-07-14 PROCEDURE — 99213 OFFICE O/P EST LOW 20 MIN: CPT | Performed by: UROLOGY

## 2025-07-14 PROCEDURE — 51798 US URINE CAPACITY MEASURE: CPT | Performed by: UROLOGY

## 2025-07-14 PROCEDURE — 53665 DILATION OF URETHRA: CPT | Performed by: UROLOGY

## 2025-07-14 RX ORDER — CIPROFLOXACIN 500 MG/1
500 TABLET, FILM COATED ORAL ONCE
Status: COMPLETED | OUTPATIENT
Start: 2025-07-14 | End: 2025-07-14

## 2025-07-14 RX ORDER — ESTRADIOL 0.1 MG/G
1 CREAM VAGINAL
Qty: 42.5 G | Refills: 3 | Status: SHIPPED | OUTPATIENT
Start: 2025-07-14

## 2025-07-14 RX ADMIN — CIPROFLOXACIN 500 MG: 500 TABLET, FILM COATED ORAL at 11:53

## 2025-07-14 NOTE — PROGRESS NOTES
OFFICE CYSTOSCOPY    The patient presented for cystoscopy and was placed supine on the procedure table.  The genitals were prepped with chlorahexadine and a Urojet.  Using a 16F flexible cystoscope, cystourerthroscopy was performed.  Cystoscopy - Female    Findings:  Initial residual: minimal  Anterior urethra: normal mucosa  Bladder neck: Mild atrophy little meatal stenosis  Bladder mucosa: Intact, no bas fond deformity, did not descend with strain  Trabeculation: none  Diverticula: none  Ureteral orifices: normal, efflux clear urine    CMG  Dilated to 24 Swedish   Initial urge at (cc): 450  Strong urge at (cc): 550  Findings: No uninhibited contractions noted.  No urge related incontinence noted    Uroflow/ PVR    Max Flow: 22 ml/sec    Avg flow: 22 ml/sec    Voided Volume:  535 ml    Residual Volume:0 ml    Shape of the curve: Normal bell shaped curve    Impression: I dilated the patient to a 24 [place]inean with dilators and then she voided she says voiding much better she had been so we will see her back in 6 weeks leave her on the cream and go from there

## 2025-07-14 NOTE — PROGRESS NOTES
HISTORY OF PRESENT ILLNESS  Adia Quiroz is a 64 y.o. female   Patient returns today still in the fair amount of frequency and difficulty urinating.  Set up for cystoscopy and evaluation of her bladder she is aware the risk of bleeding infection injury to the bladder she has no questions  1. Atrophy of urethra  Overview:  T with estrace cream  Orders:  -     estradiol (ESTRACE VAGINAL) 0.1 MG/GM vaginal cream; Place 1 g vaginally three times a week Apply fingertip amount/pea size directly to the opening of the urethra with a fingertip Monday Wednesday Friday do not use applicator, Disp-42.5 g, R-3Print  2. Urinary urgency  Overview:  Plan for cystoscopy and urethral dialtion  Orders:  -     AMB POC URINALYSIS DIP STICK AUTO W/O MICRO  -     ciprofloxacin (CIPRO) tablet 500 mg; 500 mg, Oral, ONCE, 1 dose, On Mon 7/14/25 at 1215Antimicrobial Indications: Surgical ProphylaxisDo not take with dairy products or calcium-fortified juices. Tube feeding (TF) interaction, obtain physician order to manage. Recommend holding TF for 1 hr before and 1 hr after dose. Due to decreased absorption do not give by J tube.     -     AMB POC PVR, YULIYA,POST-VOID RES,US,NON-IMAGING  -     AMB POC UROFLOWMETRY  -     estradiol (ESTRACE VAGINAL) 0.1 MG/GM vaginal cream; Place 1 g vaginally three times a week Apply fingertip amount/pea size directly to the opening of the urethra with a fingertip Monday Wednesday Friday do not use applicator, Disp-42.5 g, R-3Print  3. Atrophic vaginitis  -     estradiol (ESTRACE VAGINAL) 0.1 MG/GM vaginal cream; Place 1 g vaginally three times a week Apply fingertip amount/pea size directly to the opening of the urethra with a fingertip Monday Wednesday Friday do not use applicator, Disp-42.5 g, R-3Print  4. Recurrent UTI  -     estradiol (ESTRACE VAGINAL) 0.1 MG/GM vaginal cream; Place 1 g vaginally three times a week Apply fingertip amount/pea size directly to the opening of the urethra with a fingertip

## 2025-07-14 NOTE — PROGRESS NOTES
Chief Complaint   Patient presents with    Follow-up       /84 (BP Site: Left Upper Arm, Patient Position: Sitting, BP Cuff Size: Large Adult)   Pulse 84     1. Have you been to the ER, urgent care clinic since your last visit?  Hospitalized since your last visit?No    2. Have you seen or consulted any other health care providers outside of the [facility] since your last visit?  Include any pap smears or colon screening. No

## 2025-08-27 ENCOUNTER — OFFICE VISIT (OUTPATIENT)
Age: 65
End: 2025-08-27

## 2025-08-27 VITALS — HEART RATE: 75 BPM | SYSTOLIC BLOOD PRESSURE: 124 MMHG | DIASTOLIC BLOOD PRESSURE: 75 MMHG

## 2025-08-27 DIAGNOSIS — R33.9 URINARY RETENTION: ICD-10-CM

## 2025-08-27 DIAGNOSIS — R39.15 URINARY URGENCY: Primary | ICD-10-CM

## 2025-08-27 DIAGNOSIS — Q64.31 URETHRA AND BLADDER NECK ATRESIA AND STENOSIS: ICD-10-CM

## 2025-08-27 DIAGNOSIS — N36.8 ATROPHY OF URETHRA: ICD-10-CM

## 2025-08-27 DIAGNOSIS — N39.0 RECURRENT UTI: ICD-10-CM

## 2025-08-27 DIAGNOSIS — N95.2 ATROPHIC VAGINITIS: ICD-10-CM

## 2025-08-27 DIAGNOSIS — N39.41 URGENCY INCONTINENCE: ICD-10-CM

## 2025-08-27 LAB
BILIRUBIN, URINE, POC: NEGATIVE
BLOOD URINE, POC: NEGATIVE
GLUCOSE URINE, POC: NEGATIVE
KETONES, URINE, POC: NEGATIVE
LEUKOCYTE ESTERASE, URINE, POC: ABNORMAL
NITRITE, URINE, POC: NEGATIVE
PH, URINE, POC: 7 (ref 4.6–8)
PROTEIN,URINE, POC: ABNORMAL
PVR, POC: 0 CC
SPECIFIC GRAVITY, URINE, POC: 1.02 (ref 1–1.03)
URINALYSIS CLARITY, POC: CLEAR
URINALYSIS COLOR, POC: YELLOW
UROBILINOGEN, POC: ABNORMAL

## 2025-08-27 RX ORDER — ESTRADIOL 0.1 MG/G
1 CREAM VAGINAL
Qty: 42.5 G | Refills: 3
Start: 2025-08-27

## 2025-08-29 LAB — BACTERIA UR CULT: ABNORMAL

## 2025-08-29 ASSESSMENT — ENCOUNTER SYMPTOMS
SINUS PAIN: 1
SORE THROAT: 1

## 2025-09-02 ENCOUNTER — RESULTS FOLLOW-UP (OUTPATIENT)
Age: 65
End: 2025-09-02